# Patient Record
Sex: FEMALE | Race: WHITE | NOT HISPANIC OR LATINO | URBAN - METROPOLITAN AREA
[De-identification: names, ages, dates, MRNs, and addresses within clinical notes are randomized per-mention and may not be internally consistent; named-entity substitution may affect disease eponyms.]

---

## 2017-06-21 ENCOUNTER — INPATIENT (INPATIENT)
Facility: HOSPITAL | Age: 82
LOS: 2 days | Discharge: HOME | DRG: 184 | End: 2017-06-24
Attending: SURGERY | Admitting: SURGERY
Payer: COMMERCIAL

## 2017-06-21 VITALS
OXYGEN SATURATION: 96 % | RESPIRATION RATE: 18 BRPM | DIASTOLIC BLOOD PRESSURE: 73 MMHG | TEMPERATURE: 98 F | SYSTOLIC BLOOD PRESSURE: 172 MMHG | HEART RATE: 82 BPM

## 2017-06-21 DIAGNOSIS — Z98.51 TUBAL LIGATION STATUS: Chronic | ICD-10-CM

## 2017-06-21 LAB
ALBUMIN SERPL ELPH-MCNC: 4.1 G/DL — SIGNIFICANT CHANGE UP (ref 3.3–5)
ALP SERPL-CCNC: 58 U/L — SIGNIFICANT CHANGE UP (ref 40–120)
ALT FLD-CCNC: 27 U/L RC — SIGNIFICANT CHANGE UP (ref 10–45)
ANION GAP SERPL CALC-SCNC: 15 MMOL/L — SIGNIFICANT CHANGE UP (ref 5–17)
APTT BLD: 27.8 SEC — SIGNIFICANT CHANGE UP (ref 27.5–37.4)
AST SERPL-CCNC: 34 U/L — SIGNIFICANT CHANGE UP (ref 10–40)
BILIRUB SERPL-MCNC: 0.4 MG/DL — SIGNIFICANT CHANGE UP (ref 0.2–1.2)
BUN SERPL-MCNC: 22 MG/DL — SIGNIFICANT CHANGE UP (ref 7–23)
CALCIUM SERPL-MCNC: 9.7 MG/DL — SIGNIFICANT CHANGE UP (ref 8.4–10.5)
CHLORIDE SERPL-SCNC: 103 MMOL/L — SIGNIFICANT CHANGE UP (ref 96–108)
CO2 SERPL-SCNC: 21 MMOL/L — LOW (ref 22–31)
CREAT SERPL-MCNC: 0.88 MG/DL — SIGNIFICANT CHANGE UP (ref 0.5–1.3)
GLUCOSE SERPL-MCNC: 115 MG/DL — HIGH (ref 70–99)
HCT VFR BLD CALC: 45.1 % — HIGH (ref 34.5–45)
HGB BLD-MCNC: 15.2 G/DL — SIGNIFICANT CHANGE UP (ref 11.5–15.5)
INR BLD: 0.96 RATIO — SIGNIFICANT CHANGE UP (ref 0.88–1.16)
MAGNESIUM SERPL-MCNC: 2.2 MG/DL — SIGNIFICANT CHANGE UP (ref 1.6–2.6)
MCHC RBC-ENTMCNC: 31.1 PG — SIGNIFICANT CHANGE UP (ref 27–34)
MCHC RBC-ENTMCNC: 33.7 GM/DL — SIGNIFICANT CHANGE UP (ref 32–36)
MCV RBC AUTO: 92.2 FL — SIGNIFICANT CHANGE UP (ref 80–100)
PHOSPHATE SERPL-MCNC: 3.4 MG/DL — SIGNIFICANT CHANGE UP (ref 2.5–4.5)
PLATELET # BLD AUTO: 195 K/UL — SIGNIFICANT CHANGE UP (ref 150–400)
POTASSIUM SERPL-MCNC: 4.9 MMOL/L — SIGNIFICANT CHANGE UP (ref 3.5–5.3)
POTASSIUM SERPL-SCNC: 4.9 MMOL/L — SIGNIFICANT CHANGE UP (ref 3.5–5.3)
PROT SERPL-MCNC: 7.6 G/DL — SIGNIFICANT CHANGE UP (ref 6–8.3)
PROTHROM AB SERPL-ACNC: 10.4 SEC — SIGNIFICANT CHANGE UP (ref 9.8–12.7)
RBC # BLD: 4.89 M/UL — SIGNIFICANT CHANGE UP (ref 3.8–5.2)
RBC # FLD: 12.7 % — SIGNIFICANT CHANGE UP (ref 10.3–14.5)
SODIUM SERPL-SCNC: 139 MMOL/L — SIGNIFICANT CHANGE UP (ref 135–145)
WBC # BLD: 12.6 K/UL — HIGH (ref 3.8–10.5)
WBC # FLD AUTO: 12.6 K/UL — HIGH (ref 3.8–10.5)

## 2017-06-21 PROCEDURE — 71250 CT THORAX DX C-: CPT | Mod: 26

## 2017-06-21 PROCEDURE — 71100 X-RAY EXAM RIBS UNI 2 VIEWS: CPT | Mod: 26

## 2017-06-21 PROCEDURE — 70450 CT HEAD/BRAIN W/O DYE: CPT | Mod: 26

## 2017-06-21 PROCEDURE — 99285 EMERGENCY DEPT VISIT HI MDM: CPT

## 2017-06-21 RX ORDER — LIDOCAINE 4 G/100G
1 CREAM TOPICAL ONCE
Qty: 0 | Refills: 0 | Status: COMPLETED | OUTPATIENT
Start: 2017-06-21 | End: 2017-06-21

## 2017-06-21 RX ORDER — ACETAMINOPHEN 500 MG
1000 TABLET ORAL ONCE
Qty: 0 | Refills: 0 | Status: COMPLETED | OUTPATIENT
Start: 2017-06-21 | End: 2017-06-21

## 2017-06-21 RX ADMIN — Medication 400 MILLIGRAM(S): at 22:28

## 2017-06-21 RX ADMIN — LIDOCAINE 1 PATCH: 4 CREAM TOPICAL at 22:28

## 2017-06-21 NOTE — ED ADULT NURSE NOTE - OBJECTIVE STATEMENT
86 year old female A&OX3 presents with syncope and right rib pain. Patient and son state patient was getting changed in the bathroom this morning when she began to feel weak, loss consciousness, and possibly landed on the right side of her chest. Patient and son state she quickly regain consciousness. Patient states she has felt weak all day. Patient's lung sounds clear and equal bilaterally. Patient denies nausea, vomiting, dizziness, shortness of breath, chest pain, fevers, chills, burning urination, difficulty urinating, diarrhea, constipation.

## 2017-06-21 NOTE — ED PROVIDER NOTE - OBJECTIVE STATEMENT
86 F w htn, hld, remote bladder ca p/w fall this morning in bathroom. States she was feeling well, when she bent over to adjust her bra strap and fell hitting her ribs on the counter, no head trauma, fell into a seated position. Believes she lost consciousness for a few seconds but was never fully laying on the floor, and woke up without memory loss or confusion. Afterwards no dizziness, no headache, no vision changes. Felt palpitations for a few minutes after. No history of cardiac problems or syncope. Went to a mammogram afterwards and then to her hip clinic where she was advised to come to the ER. No self or family history of cva. No  DM2 or smoking.

## 2017-06-21 NOTE — ED PROVIDER NOTE - MEDICAL DECISION MAKING DETAILS
patient w/ brief syncopal event this am concerning for TIA, rib trauma no head trauma. No neuro symptoms and exam normal. Will check ekg, place on monitor, check labs and ua. patient w/ brief syncopal event this am concerning for TIA, rib trauma no head trauma. No neuro symptoms and exam normal. Will check ekg, place on monitor, check labs and ua.    Attending MD Benitez: 86 F w htn, hld, remote bladder ca p/w fall this morning in bathroom. States she was feeling well, when she bent over to adjust her bra strap and fell hitting her ribs on the counter, no head trauma, fell into a seated position. Believes she lost consciousness for a few seconds but was never fully laying on the floor, and woke up without memory loss or confusion. Complains of right sided rib pain. Attending MD Benitez: A & O x 3, NAD, HEENT WNL and no facial asymmetry; lungs CTAB, Right sided rib TTP, no crepitus,  heart with reg rhythm without murmur; abdomen soft NTND; extremities with no edema; skin with no rashes, neuro exam non focal with no motor or sensory deficits.  Plan: rule out ACS, EKG, labs, monitor, head CT and chest xray and rib series to rule out fx.  Admit.

## 2017-06-21 NOTE — ED PROVIDER NOTE - ATTENDING CONTRIBUTION TO CARE
Attending MD Benitez:  I personally have seen and examined this patient.  Resident note reviewed and agree on plan of care and except where noted.  See MDM for details.

## 2017-06-21 NOTE — ED ADULT TRIAGE NOTE - CHIEF COMPLAINT QUOTE
syncope with R rib pain  patient passed out while getting dressed in the bathroom this morning; hit side on corner of sink  Denies head pain or injury

## 2017-06-21 NOTE — ED PROVIDER NOTE - PROGRESS NOTE DETAILS
ATTENDING MD Clark.  Pt signed out to me with trauma consult pending, CT head, troponins, Syncopized while looking down, R 3/4/5/7 rib fxs.  Following signout trauma requesting admission to their service for continued management of multiple rib fxs without apparent flail segment.  Stable for admission to trauma service.  EKG, labs non-actionable.

## 2017-06-22 DIAGNOSIS — M81.0 AGE-RELATED OSTEOPOROSIS WITHOUT CURRENT PATHOLOGICAL FRACTURE: ICD-10-CM

## 2017-06-22 DIAGNOSIS — N39.0 URINARY TRACT INFECTION, SITE NOT SPECIFIED: ICD-10-CM

## 2017-06-22 DIAGNOSIS — I10 ESSENTIAL (PRIMARY) HYPERTENSION: ICD-10-CM

## 2017-06-22 DIAGNOSIS — E78.5 HYPERLIPIDEMIA, UNSPECIFIED: ICD-10-CM

## 2017-06-22 DIAGNOSIS — R55 SYNCOPE AND COLLAPSE: ICD-10-CM

## 2017-06-22 DIAGNOSIS — S22.41XA MULTIPLE FRACTURES OF RIBS, RIGHT SIDE, INITIAL ENCOUNTER FOR CLOSED FRACTURE: ICD-10-CM

## 2017-06-22 LAB
ANION GAP SERPL CALC-SCNC: 12 MMOL/L — SIGNIFICANT CHANGE UP (ref 5–17)
APPEARANCE UR: ABNORMAL
APTT BLD: 34.8 SEC — SIGNIFICANT CHANGE UP (ref 27.5–37.4)
BILIRUB UR-MCNC: NEGATIVE — SIGNIFICANT CHANGE UP
BUN SERPL-MCNC: 17 MG/DL — SIGNIFICANT CHANGE UP (ref 7–23)
CALCIUM SERPL-MCNC: 9 MG/DL — SIGNIFICANT CHANGE UP (ref 8.4–10.5)
CHLORIDE SERPL-SCNC: 105 MMOL/L — SIGNIFICANT CHANGE UP (ref 96–108)
CO2 SERPL-SCNC: 23 MMOL/L — SIGNIFICANT CHANGE UP (ref 22–31)
COLOR SPEC: YELLOW — SIGNIFICANT CHANGE UP
CREAT SERPL-MCNC: 0.79 MG/DL — SIGNIFICANT CHANGE UP (ref 0.5–1.3)
DIFF PNL FLD: ABNORMAL
GLUCOSE SERPL-MCNC: 99 MG/DL — SIGNIFICANT CHANGE UP (ref 70–99)
GLUCOSE UR QL: NEGATIVE — SIGNIFICANT CHANGE UP
HCT VFR BLD CALC: 39.6 % — SIGNIFICANT CHANGE UP (ref 34.5–45)
HGB BLD-MCNC: 13.3 G/DL — SIGNIFICANT CHANGE UP (ref 11.5–15.5)
INR BLD: 1.05 RATIO — SIGNIFICANT CHANGE UP (ref 0.88–1.16)
KETONES UR-MCNC: NEGATIVE — SIGNIFICANT CHANGE UP
LEUKOCYTE ESTERASE UR-ACNC: ABNORMAL
MAGNESIUM SERPL-MCNC: 2.1 MG/DL — SIGNIFICANT CHANGE UP (ref 1.6–2.6)
MCHC RBC-ENTMCNC: 30.8 PG — SIGNIFICANT CHANGE UP (ref 27–34)
MCHC RBC-ENTMCNC: 33.6 GM/DL — SIGNIFICANT CHANGE UP (ref 32–36)
MCV RBC AUTO: 91.6 FL — SIGNIFICANT CHANGE UP (ref 80–100)
NITRITE UR-MCNC: POSITIVE
PH UR: 6 — SIGNIFICANT CHANGE UP (ref 5–8)
PHOSPHATE SERPL-MCNC: 3.3 MG/DL — SIGNIFICANT CHANGE UP (ref 2.5–4.5)
PLATELET # BLD AUTO: 179 K/UL — SIGNIFICANT CHANGE UP (ref 150–400)
POTASSIUM SERPL-MCNC: 4.1 MMOL/L — SIGNIFICANT CHANGE UP (ref 3.5–5.3)
POTASSIUM SERPL-SCNC: 4.1 MMOL/L — SIGNIFICANT CHANGE UP (ref 3.5–5.3)
PROT UR-MCNC: SIGNIFICANT CHANGE UP
PROTHROM AB SERPL-ACNC: 11.4 SEC — SIGNIFICANT CHANGE UP (ref 9.8–12.7)
RBC # BLD: 4.32 M/UL — SIGNIFICANT CHANGE UP (ref 3.8–5.2)
RBC # FLD: 12.4 % — SIGNIFICANT CHANGE UP (ref 10.3–14.5)
SODIUM SERPL-SCNC: 140 MMOL/L — SIGNIFICANT CHANGE UP (ref 135–145)
SP GR SPEC: 1.02 — SIGNIFICANT CHANGE UP (ref 1.01–1.02)
UROBILINOGEN FLD QL: NEGATIVE — SIGNIFICANT CHANGE UP
WBC # BLD: 8.7 K/UL — SIGNIFICANT CHANGE UP (ref 3.8–10.5)
WBC # FLD AUTO: 8.7 K/UL — SIGNIFICANT CHANGE UP (ref 3.8–10.5)

## 2017-06-22 PROCEDURE — 99223 1ST HOSP IP/OBS HIGH 75: CPT

## 2017-06-22 PROCEDURE — 71010: CPT | Mod: 26

## 2017-06-22 PROCEDURE — 72170 X-RAY EXAM OF PELVIS: CPT | Mod: 26

## 2017-06-22 RX ORDER — IBUPROFEN 200 MG
400 TABLET ORAL EVERY 6 HOURS
Qty: 0 | Refills: 0 | Status: COMPLETED | OUTPATIENT
Start: 2017-06-22 | End: 2017-06-23

## 2017-06-22 RX ORDER — OXYCODONE HYDROCHLORIDE 5 MG/1
5 TABLET ORAL EVERY 4 HOURS
Qty: 0 | Refills: 0 | Status: DISCONTINUED | OUTPATIENT
Start: 2017-06-22 | End: 2017-06-24

## 2017-06-22 RX ORDER — ENOXAPARIN SODIUM 100 MG/ML
40 INJECTION SUBCUTANEOUS ONCE
Qty: 0 | Refills: 0 | Status: COMPLETED | OUTPATIENT
Start: 2017-06-22 | End: 2017-06-22

## 2017-06-22 RX ORDER — OXYCODONE HYDROCHLORIDE 5 MG/1
10 TABLET ORAL EVERY 4 HOURS
Qty: 0 | Refills: 0 | Status: DISCONTINUED | OUTPATIENT
Start: 2017-06-22 | End: 2017-06-24

## 2017-06-22 RX ORDER — ACETAMINOPHEN 500 MG
975 TABLET ORAL EVERY 6 HOURS
Qty: 0 | Refills: 0 | Status: DISCONTINUED | OUTPATIENT
Start: 2017-06-22 | End: 2017-06-24

## 2017-06-22 RX ORDER — AMLODIPINE BESYLATE 2.5 MG/1
5 TABLET ORAL DAILY
Qty: 0 | Refills: 0 | Status: DISCONTINUED | OUTPATIENT
Start: 2017-06-22 | End: 2017-06-24

## 2017-06-22 RX ORDER — LIDOCAINE 4 G/100G
1 CREAM TOPICAL DAILY
Qty: 0 | Refills: 0 | Status: DISCONTINUED | OUTPATIENT
Start: 2017-06-22 | End: 2017-06-24

## 2017-06-22 RX ORDER — LISINOPRIL 2.5 MG/1
40 TABLET ORAL DAILY
Qty: 0 | Refills: 0 | Status: DISCONTINUED | OUTPATIENT
Start: 2017-06-22 | End: 2017-06-24

## 2017-06-22 RX ORDER — ENOXAPARIN SODIUM 100 MG/ML
40 INJECTION SUBCUTANEOUS DAILY
Qty: 0 | Refills: 0 | Status: DISCONTINUED | OUTPATIENT
Start: 2017-06-22 | End: 2017-06-24

## 2017-06-22 RX ADMIN — Medication 400 MILLIGRAM(S): at 03:24

## 2017-06-22 RX ADMIN — Medication 975 MILLIGRAM(S): at 17:09

## 2017-06-22 RX ADMIN — Medication 400 MILLIGRAM(S): at 21:27

## 2017-06-22 RX ADMIN — ENOXAPARIN SODIUM 40 MILLIGRAM(S): 100 INJECTION SUBCUTANEOUS at 11:40

## 2017-06-22 RX ADMIN — Medication 975 MILLIGRAM(S): at 11:40

## 2017-06-22 RX ADMIN — Medication 400 MILLIGRAM(S): at 17:57

## 2017-06-22 RX ADMIN — LIDOCAINE 1 PATCH: 4 CREAM TOPICAL at 12:08

## 2017-06-22 RX ADMIN — Medication 1 TABLET(S): at 18:08

## 2017-06-22 RX ADMIN — ENOXAPARIN SODIUM 40 MILLIGRAM(S): 100 INJECTION SUBCUTANEOUS at 02:45

## 2017-06-22 RX ADMIN — Medication 400 MILLIGRAM(S): at 17:08

## 2017-06-22 RX ADMIN — Medication 975 MILLIGRAM(S): at 05:41

## 2017-06-22 RX ADMIN — Medication 400 MILLIGRAM(S): at 11:41

## 2017-06-22 RX ADMIN — Medication 400 MILLIGRAM(S): at 12:41

## 2017-06-22 RX ADMIN — Medication 400 MILLIGRAM(S): at 22:00

## 2017-06-22 RX ADMIN — Medication 975 MILLIGRAM(S): at 23:22

## 2017-06-22 RX ADMIN — OXYCODONE HYDROCHLORIDE 5 MILLIGRAM(S): 5 TABLET ORAL at 01:33

## 2017-06-22 RX ADMIN — AMLODIPINE BESYLATE 5 MILLIGRAM(S): 2.5 TABLET ORAL at 02:43

## 2017-06-22 RX ADMIN — Medication 400 MILLIGRAM(S): at 02:43

## 2017-06-22 RX ADMIN — LISINOPRIL 40 MILLIGRAM(S): 2.5 TABLET ORAL at 05:57

## 2017-06-22 NOTE — H&P ADULT - ASSESSMENT
86F w/ R 3-8 rib fractures   - Admit to trauma surgery   - Regular diet  - Incentive spirometry   - PT  - Multimodal pain control  - D/w Dr Nelson    #[id number] 86F w/ R 3-8 rib fractures   - Admit to trauma surgery; SICU consult given multiple rib fractures in a geriatric patient   - Regular diet  - Incentive spirometry   - PT  - Multimodal pain control  - D/w Dr Nelson    #[id number]

## 2017-06-22 NOTE — H&P ADULT - ATTENDING COMMENTS
86 year old female presented after a fall in her bathroom with resulting Left 3-8 rib fractures. She is hemodynamically appropriate and able to take in 1000cc on the incentive spirometer. GCS 15. She is not in respiratory distress. I have reviewed her past medical/ surgical history, medications, imaging and labs.  She is admitted with multiple left sided rib fractures- multimodal pain management with tylenol, ibuprofen and lidocaine patch. The patient is to ambulate with physical therapy today.     Essential hypertension- continue home medications of norvasc and ramipril  Patient can be on regular diet  I have discussed plan with her and her sons who are present at bedside.

## 2017-06-22 NOTE — CONSULT NOTE ADULT - PROBLEM SELECTOR RECOMMENDATION 5
1. Resume alendronate on discharge.  2. Patient was to have DEXA screening performed yesterday; should reschedule on discharge.  3. Can check TSH for screening; appears euthyroid on exam.

## 2017-06-22 NOTE — CONSULT NOTE ADULT - SUBJECTIVE AND OBJECTIVE BOX
Chief Complaint: Fall    HPI: Ms. Warner is an 86 year old female with a PMHx significant for HTN, HLD, osteoporosis who presented to the ER after a fall. Per the patient, she was standing alone in her bathroom when she looked down towards the sink, and then she blacked out and fell to the ground. She fell into a seated position, noted some pain in her ribs on the right side, and was ultimately able to rise to a standing position. She denies any palpitations, chest pain, shortness of breath, diaphoresis prior to her fall. She denies frequent or recent falls. Three years ago she had a stumble while walking laundry down the stairs and landed on her left side; she may have broken ribs during that fall. She reports a recent history of dizziness (room spinning) when moving her head quickly. She also reports numbness/tingling in her legs from time to time. There is no report of recent fever or urinary symptoms. She has never had a stress test. Per patient and her son, she walks 1-2 miles per day, up to 10 miles per week, without chest pain or SOB.      PAST MEDICAL & SURGICAL HISTORY:  Osteoporosis  Cystocele with pessary  HTN (hypertension)  High cholesterol  H/O tubal ligation      Review of Systems:   CONSTITUTIONAL: No fever.  EYES: No eye pain or discharge.  ENMT:  No sinus or throat pain  NECK: No pain or stiffness  RESPIRATORY: No cough, wheezing, chills or hemoptysis; No shortness of breath  CARDIOVASCULAR: No chest pain, palpitations, dizziness, or leg swelling  GASTROINTESTINAL: No abdominal or epigastric pain. No nausea, vomiting, or hematemesis; No diarrhea or constipation. No melena or hematochezia.  GENITOURINARY: No dysuria or incontinence. Has pessary  NEUROLOGICAL: No headaches, memory loss, loss of strength, numbness, or tremors. See HPI.  SKIN: No rashes.  LYMPH NODES: No enlarged glands  ENDOCRINE: No heat or cold intolerance; No hair loss  MUSCULOSKELETAL: No joint pain or swelling; No muscle, back, or extremity pain  PSYCHIATRIC: No depression, anxiety, mood swings, or difficulty sleeping  HEME/LYMPH: No easy bruising, or bleeding gums  ALLERY AND IMMUNOLOGIC: No hives or eczema    Allergies: No Known Allergies    Intolerances: Denies    Social History: Lives with two adopted sons. Former clerical worker. No smoking, EtOH, or substance abuse history.    FAMILY HISTORY:  Sister recently  after diagnosis of cancer at age 86.      MEDICATIONS  (STANDING):  acetaminophen   Tablet 975milliGRAM(s) Oral every 6 hours  ibuprofen  Tablet 400milliGRAM(s) Oral every 6 hours  lidocaine   Patch 1Patch Transdermal daily  amLODIPine   Tablet 5milliGRAM(s) Oral daily  lisinopril 40milliGRAM(s) Oral daily  enoxaparin Injectable 40milliGRAM(s) SubCutaneous daily    MEDICATIONS  (PRN):  oxyCODONE IR 5milliGRAM(s) Oral every 4 hours PRN Moderate Pain (4 - 6)  oxyCODONE IR 10milliGRAM(s) Oral every 4 hours PRN Severe Pain (7 - 10)      Vital Signs Last 24 Hrs  T(C): 36.8, Max: 36.9 (06-21 @ 20:17)  HR: 63 (58 - 84)  BP: 141/63 (118/56 - 172/73)  RR: 23 (17 - 33)  SpO2: 95% (95% - 97%)  Wt(kg): --  CAPILLARY BLOOD GLUCOSE    I&O's Summary    I & Os for current day (as of 2017 17:12)  =============================================  IN: 300 ml / OUT: 700 ml / NET: -400 ml      PHYSICAL EXAM:  GENERAL: NAD, well-developed  HEAD:  Atraumatic, Normocephalic  EYES: EOMI, PERRLA, conjunctiva and sclera clear. + horizontal nystagmus.  NECK: Supple, No JVD. + carotid bruit on left  CHEST/LUNG: Clear to auscultation bilaterally; No wheeze. Mild tenderness to palpation over right rib cage.  HEART: Regular rate and rhythm; No murmurs, rubs, or gallops  ABDOMEN: Soft, Nontender, Nondistended; Bowel sounds present  EXTREMITIES:  2+ Peripheral Pulses, No clubbing, cyanosis, or edema  PSYCH: AAOx3. Mood appropriate.  NEUROLOGY: CN 2-12 intact. No facial droop. Tongue midline. Deltoids 5/5 bilaterally. Biceps 5/5 bilaterally. Triceps 5/5 bilaterally. Hip flexors 4/5 bilaterally. Hip abductors 5/5 bilaterally. Hip adductors 5/5 bilaterally. Foot plantar/dorsiflexors 5/5 bilaterally. Downgoing Babinski bilaterally. Sensation to light touch intact in feet and legs bilaterally. Proprioception intact in feet. Nystagmus as above.  SKIN: No rashes or lesions    LABS:                        13.3   8.7   )-----------( 179      ( 2017 05:52 )             39.6     06-    140  |  105  |  17  ----------------------------<  99  4.1   |  23  |  0.79    Ca    9.0      2017 05:52  Phos  3.3       Mg     2.1         TPro  7.6  /  Alb  4.1  /  TBili  0.4  /  DBili  x   /  AST  34  /  ALT  27  /  AlkPhos  58  06-21    PT/INR - ( 2017 05:52 )   PT: 11.4 sec;   INR: 1.05 ratio         PTT - ( 2017 05:52 )  PTT:34.8 sec  CARDIAC MARKERS ( 2017 20:48 )  x     / <0.01 ng/mL / 150 U/L / x     / 3.1 ng/mL      Urinalysis Basic - ( 2017 08:08 )    Color: Yellow / Appearance: SL Turbid / S.019 / pH: x  Gluc: x / Ketone: Negative  / Bili: Negative / Urobili: Negative   Blood: x / Protein: Trace / Nitrite: Positive   Leuk Esterase: Large / RBC: 2-5 /HPF / WBC >50 /HPF   Sq Epi: x / Non Sq Epi: x / Bacteria: Many /HPF        RADIOLOGY & ADDITIONAL TESTS:    Imaging Personally Reviewed:    CT Head:No acute intracranial hemorrhage, mass effect, or CT evidence of an acute   transcortical infarct.    Mild to moderate chronic ischemic changes in the frontoparietal white   matter and indeterminate age punctate lacunar infarcts in the bilateral   basal ganglia.    CT Chest:Acute fractures of the right fourth through eighth ribs fractured at 2   sites and acute fracture of the right anterior third rib; several   fractures are mildly displaced. Trace right pleural effusion. Several chronic appearing left-sided   rib fractures. There is a chronic appearing deformity of the right   scapula.    CXR; Right-sided rib fractures. Clear lungs.    Pelvis XR: The bones are diffusely demineralized. There is no acute   fracture or dislocation. There are degenerative changes of the lumbar   spine, hip joints, and pubic symphysis. Bilateral radiopacities within   the pelvis are likely secondary to calcified fibroids. Vascular   opacifications are noted.    EKG Personally Reviewed: NSR at 87 bpm. No ST-T changes.    Consultant(s) Notes Reviewed:  Surgery/SICU    Care Discussed with Consultants/Other Providers: Surgery - Dr. Nicholson, SICU team

## 2017-06-22 NOTE — CONSULT NOTE ADULT - ASSESSMENT
86y female with history of HLD, HTN, s/p mechanical fall with R 3-8 rib fractures. Brought to SICU for respiratory monitoring    Neuro: Will need multimodal pain control  - Lidocaine patch, motrin, oxycodone PRN  CV: Restart home medications  Pulm: Incentive spirometry, must ensure good pulmonary toilet  GI/Nutrition: Continue regular diet  /Renal: Monitor electrolytes on BMP  ID: No sign of infection  Tubes/Lines/Drains: Continue peripheral iVS  Endocrine: Monitor glycemic control on BMP  Skin: Encourage offloading  Prophylaxis: DVT chemoprophylaxis with lovenox  Dispo: Full code, remain in SICU

## 2017-06-22 NOTE — CONSULT NOTE ADULT - PROBLEM SELECTOR RECOMMENDATION 2
Patient has minimal pain, oxygenating well, not tachycardic. Likely has small hemothorax as well based on CT imaging.  1. Incentive spirometry.  2. Multimodal pain control as ordered.  3. PT evaluation.  4. Add vitamin D supplement for fall prevention.

## 2017-06-22 NOTE — CONSULT NOTE ADULT - PROBLEM SELECTOR RECOMMENDATION 6
Given unclear etiology of syncope, not unreasonable to treat with short course of antimicrobials. Pessary can increase incidence of UTIs; it can also cause colonization, which may be what we're seeing here. WBC count is normal. Patient is afebrile.  1. Given overall clinical picture, would treat with small course of antibiotics. Recommend ceftriaxone 1 g IV q24h x 3 days and to f/u cultures.  2. If cultures with polymicrobial growth, this would support colonization further, in which case stopping antibiotics and observing is reasonable.  3. Monitor for diarrhea.

## 2017-06-22 NOTE — CONSULT NOTE ADULT - SUBJECTIVE AND OBJECTIVE BOX
HISTORY OF PRESENT ILLNESS:  This is an 86 year old female w/ HTN s/p fall in the bathroom this morning. She was getting ready in the bathroom to go for her mammogram appointment, and while she was getting dressed, she blacked out fell in a seated position. Unknown LOC, no memory loss, no seizures. She was flustered by falling and it took her a moment or two to calm down and get up. Denies palpitations or chest pain. No history of cardiac disease. No history of syncopal episodes. Found on imaging to have R 3-8 rib fractures, SICU consult called.    PAST MEDICAL HISTORY: Osteoporosis  Cystocele  HTN (hypertension)  High cholesterol      PAST SURGICAL HISTORY: H/O tubal ligation      FAMILY HISTORY: No pertinent family history in first degree relatives      SOCIAL HISTORY: No smoking  No alcohol abuse  No recreational drug use    CODE STATUS: Full Code    HOME MEDICATIONS:   · 	alendronate 70 mg oral tablet: 1 tab(s) orally once a week, Last Dose Taken:    · 	Norvasc 5 mg oral tablet: 1 tab(s) orally once a day, Last Dose Taken:    · 	simvastatin 10 mg oral tablet: 1 tab(s) orally once a day (at bedtime), Last Dose Taken:    · 	ramipril 5 mg oral capsule: 1 cap(s) orally once a day, Last Dose Taken:      ALLERGIES: No Known Allergies      VITAL SIGNS:  ICU Vital Signs Last 24 Hrs  T(C): 36.5, Max: 36.9 (06-21 @ 20:17)  T(F): 97.7, Max: 98.5 (06-21 @ 20:17)  HR: 81 (76 - 84)  BP: 156/74 (124/72 - 172/73)  BP(mean): 107 (107 - 107)  ABP: --  ABP(mean): --  RR: 33 (18 - 33)  SpO2: 95% (95% - 96%)      NEURO  Exam: Awake, alert and oriented x4. Pain controlled  acetaminophen   Tablet 975milliGRAM(s) Oral every 6 hours  oxyCODONE IR 5milliGRAM(s) Oral every 4 hours PRN Moderate Pain (4 - 6)  oxyCODONE IR 10milliGRAM(s) Oral every 4 hours PRN Severe Pain (7 - 10)  ibuprofen  Tablet 400milliGRAM(s) Oral every 6 hours      RESPIRATORY    Exam: Clear to auscultation bilaterally. No increased work of breathing      CARDIOVASCULAR    Exam: Regular rate and rhythm  Cardiac Rhythm: Sinus  amLODIPine   Tablet 5milliGRAM(s) Oral daily  lisinopril 40milliGRAM(s) Oral daily      GI/NUTRITION  Exam: Abdomen soft, nontender, minimal ecchymosis along left thorax  Diet: Reg      GENITOURINARY/RENAL      Weight (kg): 46.9 (06-22 @ 03:12)  06-21    139  |  103  |  22  ----------------------------<  115<H>  4.9   |  21<L>  |  0.88    Ca    9.7      21 Jun 2017 20:48  Phos  3.4     06-21  Mg     2.2     06-21    TPro  7.6  /  Alb  4.1  /  TBili  0.4  /  DBili  x   /  AST  34  /  ALT  27  /  AlkPhos  58  06-21      HEMATOLOGIC  [ ] VTE Prophylaxis:  enoxaparin Injectable 40milliGRAM(s) SubCutaneous daily                          15.2   12.6  )-----------( 195      ( 21 Jun 2017 20:48 )             45.1     PT/INR - ( 21 Jun 2017 20:48 )   PT: 10.4 sec;   INR: 0.96 ratio         PTT - ( 21 Jun 2017 20:48 )  PTT:27.8 sec  Transfusion: [ ] PRBC	[ ] Platelets	[ ] FFP	[ ] Cryoprecipitate      INFECTIOUS DISEASES    RECENT CULTURES:x      ENDOCRINE    CAPILLARY BLOOD GLUCOSE      PATIENT CARE ACCESS DEVICES:  [x ] Peripheral IV  [ ] Central Venous Line	[ ] R	[ ] L	[ ] IJ	[ ] Fem	[ ] SC	Placed:   [ ] Arterial Line		[ ] R	[ ] L	[ ] Fem	[ ] Rad	[ ] Ax	Placed:   [ ] PICC:					[ ] Mediport  [ ] Urinary Catheter, Date Placed:   [x] Necessity of urinary, arterial, and venous catheters discussed    OTHER MEDICATIONS: lidocaine   Patch 1Patch Transdermal daily      IMAGING STUDIES: Acute fractures of the right fourth through eighth ribs fractured at 2 sites  and acute fracture of the right anterior third rib; several fractures are  mildly displaced. Trace right pleural effusion.

## 2017-06-22 NOTE — CONSULT NOTE ADULT - PROBLEM SELECTOR RECOMMENDATION 3
BP has been acceptable.  1. OK to continue norvasc and lisinopril as ordered with hold parameters.  2. F/U orthostatics and titrate PRN.

## 2017-06-22 NOTE — H&P ADULT - NSHPLABSRESULTS_GEN_ALL_CORE
Labs:                        15.2   12.6  )-----------( 195                  45.1     139  |  103  |  22  ----------------------------<  115  4.9   |  21  |  0.88    Imaging    CT head:   No acute intracranial hemorrhage, mass effect, or CT evidence of an acute transcortical infarct.    CT Chest:   Acute fractures of the right fourth through eighth ribs fractured at 2 sites and acute fracture of the right anterior third rib

## 2017-06-22 NOTE — H&P ADULT - NSHPPHYSICALEXAM_GEN_ALL_CORE
Vital Signs Last 24 Hrs  T(C): 36.9, Max: 36.9 (06-21 @ 20:17)  HR: 80 (80 - 82)  BP: 164/73 (164/73 - 172/73)  RR: 18 (18 - 18)  SpO2: 96% (96% - 96%)    Constitutional: Well-developed well nourished female in no acute distress  HEENT: Head is normocephalic and atraumatic, maxillofacial structures stable, no blood or discharge from nares or oral cavity, no sarmiento sign / racoon eyes, EOMI b/l, pupils [ ]mm round and reactive to light b/l, no active drainage or redness  Neck: cervical collar in place, trachea midline - no C-spine TTP  Respiratory: Breath sounds CTA b/l respirations are unlabored, no accessory muscle use, no conversational dyspnea  Cardiovascular: Regular rate & rhythm, +S1, S2  Chest: + Right chest wall TTP, no subQ emphysema or crepitus palpated, no T-spine TTP  Gastrointestinal: Abdomen soft, non-tender, non-distended, no rebound tenderness / guarding, no ecchymosis or external signs of abdominal trauma  Extremities: moving all extremities spontaneously, no point tenderness or deformity noted to upper or lower extremities b/l  Pelvis: stable  Vascular: 2+ radial, femoral, and DP pulses b/l  Neurological: GCS: 15 (4/5/6). A&O x 3; no gross sensory / motor / coordination deficits  Musculoskeletal: 5/5 strength of upper and lower extremities b/l  Back: no C/T/LS spine tenderness to palpation, no step-offs or signs of external trauma to the back    On incentive spirometry, patient was able to pull 1L breaths

## 2017-06-22 NOTE — CONSULT NOTE ADULT - ASSESSMENT
86 year old female with a PMHx significant for HTN, HLD, osteoporosis who presented to the ER after a fall. Per the patient, she was standing alone in her bathroom when she looked down towards the sink, and then she blacked out and fell to the ground. Syncope after changes in head position are always suspicious for orthostatic changes/transient changes in cerebral perfusion; however, exam notable for significant left sided carotid bruit and CT head with age indeterminate lacunar infarcts. Further neurologic work-up is prudent. Symptoms of transient vertigo can also be caused by a central process, but exam more consistent with BPPV, which may be exacerbated by transient hypotension in setting of polypharmacy.

## 2017-06-22 NOTE — H&P ADULT - HISTORY OF PRESENT ILLNESS
This is an 86 year old female w/ HTN s/p fall in the bathroom this morning. She was getting ready in the bathroom to go for her mammogram appointment, and while she was getting dressed, she blacked out fell in a seated position. Unknown LOC, no memory loss, no seizures. She was flustered by falling and it took her a moment or two to calm down and get up. Denies palpitations or chest pain. No history of cardiac disease. No history of syncopal episodes.     1ry survey: intact, GCS 15, AVSS   2ry survey: + R chest wall TTP

## 2017-06-23 DIAGNOSIS — N30.90 CYSTITIS, UNSPECIFIED WITHOUT HEMATURIA: ICD-10-CM

## 2017-06-23 LAB
ANION GAP SERPL CALC-SCNC: 13 MMOL/L — SIGNIFICANT CHANGE UP (ref 5–17)
BUN SERPL-MCNC: 21 MG/DL — SIGNIFICANT CHANGE UP (ref 7–23)
CA-I BLD-SCNC: 1.19 MMOL/L — SIGNIFICANT CHANGE UP (ref 1.12–1.3)
CALCIUM SERPL-MCNC: 9.2 MG/DL — SIGNIFICANT CHANGE UP (ref 8.4–10.5)
CHLORIDE SERPL-SCNC: 106 MMOL/L — SIGNIFICANT CHANGE UP (ref 96–108)
CO2 SERPL-SCNC: 20 MMOL/L — LOW (ref 22–31)
CREAT SERPL-MCNC: 1.04 MG/DL — SIGNIFICANT CHANGE UP (ref 0.5–1.3)
GLUCOSE SERPL-MCNC: 103 MG/DL — HIGH (ref 70–99)
HCT VFR BLD CALC: 41.6 % — SIGNIFICANT CHANGE UP (ref 34.5–45)
HGB BLD-MCNC: 13.5 G/DL — SIGNIFICANT CHANGE UP (ref 11.5–15.5)
MAGNESIUM SERPL-MCNC: 2.1 MG/DL — SIGNIFICANT CHANGE UP (ref 1.6–2.6)
MCHC RBC-ENTMCNC: 29.8 PG — SIGNIFICANT CHANGE UP (ref 27–34)
MCHC RBC-ENTMCNC: 32.3 GM/DL — SIGNIFICANT CHANGE UP (ref 32–36)
MCV RBC AUTO: 92.4 FL — SIGNIFICANT CHANGE UP (ref 80–100)
PHOSPHATE SERPL-MCNC: 3.8 MG/DL — SIGNIFICANT CHANGE UP (ref 2.5–4.5)
PLATELET # BLD AUTO: 178 K/UL — SIGNIFICANT CHANGE UP (ref 150–400)
POTASSIUM SERPL-MCNC: 4.3 MMOL/L — SIGNIFICANT CHANGE UP (ref 3.5–5.3)
POTASSIUM SERPL-SCNC: 4.3 MMOL/L — SIGNIFICANT CHANGE UP (ref 3.5–5.3)
RBC # BLD: 4.51 M/UL — SIGNIFICANT CHANGE UP (ref 3.8–5.2)
RBC # FLD: 12.4 % — SIGNIFICANT CHANGE UP (ref 10.3–14.5)
SODIUM SERPL-SCNC: 139 MMOL/L — SIGNIFICANT CHANGE UP (ref 135–145)
WBC # BLD: 8.6 K/UL — SIGNIFICANT CHANGE UP (ref 3.8–10.5)
WBC # FLD AUTO: 8.6 K/UL — SIGNIFICANT CHANGE UP (ref 3.8–10.5)

## 2017-06-23 PROCEDURE — 99232 SBSQ HOSP IP/OBS MODERATE 35: CPT

## 2017-06-23 PROCEDURE — 71010: CPT | Mod: 26

## 2017-06-23 PROCEDURE — 93880 EXTRACRANIAL BILAT STUDY: CPT | Mod: 26

## 2017-06-23 PROCEDURE — 99233 SBSQ HOSP IP/OBS HIGH 50: CPT

## 2017-06-23 RX ADMIN — Medication 400 MILLIGRAM(S): at 21:20

## 2017-06-23 RX ADMIN — LIDOCAINE 1 PATCH: 4 CREAM TOPICAL at 11:23

## 2017-06-23 RX ADMIN — Medication 400 MILLIGRAM(S): at 17:26

## 2017-06-23 RX ADMIN — Medication 975 MILLIGRAM(S): at 17:26

## 2017-06-23 RX ADMIN — LIDOCAINE 1 PATCH: 4 CREAM TOPICAL at 23:02

## 2017-06-23 RX ADMIN — LISINOPRIL 40 MILLIGRAM(S): 2.5 TABLET ORAL at 05:18

## 2017-06-23 RX ADMIN — Medication 400 MILLIGRAM(S): at 10:06

## 2017-06-23 RX ADMIN — Medication 400 MILLIGRAM(S): at 04:18

## 2017-06-23 RX ADMIN — Medication 400 MILLIGRAM(S): at 06:04

## 2017-06-23 RX ADMIN — Medication 975 MILLIGRAM(S): at 23:03

## 2017-06-23 RX ADMIN — Medication 975 MILLIGRAM(S): at 11:24

## 2017-06-23 RX ADMIN — Medication 1 TABLET(S): at 05:18

## 2017-06-23 RX ADMIN — Medication 400 MILLIGRAM(S): at 18:12

## 2017-06-23 RX ADMIN — Medication 400 MILLIGRAM(S): at 11:00

## 2017-06-23 RX ADMIN — AMLODIPINE BESYLATE 5 MILLIGRAM(S): 2.5 TABLET ORAL at 05:18

## 2017-06-23 RX ADMIN — Medication 400 MILLIGRAM(S): at 22:20

## 2017-06-23 RX ADMIN — ENOXAPARIN SODIUM 40 MILLIGRAM(S): 100 INJECTION SUBCUTANEOUS at 11:24

## 2017-06-23 RX ADMIN — Medication 1 TABLET(S): at 17:26

## 2017-06-23 RX ADMIN — Medication 975 MILLIGRAM(S): at 05:19

## 2017-06-23 NOTE — PROGRESS NOTE ADULT - SUBJECTIVE AND OBJECTIVE BOX
ACS DAILY PROGRESS NOTE    HOSPITAL DAY: 2    INTERVAL EVENTS: Pain is well controlled and she pulls more than 1000mL on IS. UA positive so urine culture sent and patient was started on a 3 day course of bactrim. Carotid duplex performed to evaluate bruit identified on physical exam. 50-69% stenosis of R proximal ICA.    SUBJECTIVE: Patient OOB to chair, son at bedside. Patient without complaints this morning; pain well controlled.     OBJECTIVE:   Gen: NAD, AOx3  Pulm: Normal respiratory pattern, pulling 1000mL on IS  Abd: Soft, NT, ND      Vital Signs Last 24 Hrs  T(C): 36.9, Max: 37.1 (06-23 @ 03:00)  T(F): 98.4, Max: 98.8 (06-23 @ 03:00)  HR: 78 (61 - 89)  BP: 124/62 (118/56 - 166/72)  BP(mean): 87 (81 - 104)  RR: 23 (19 - 31)  SpO2: 96% (94% - 97%)    I&O's Detail  I & Os for 24h ending 23 Jun 2017 07:00  =============================================  IN:    Oral Fluid: 600 ml    Total IN: 600 ml  ---------------------------------------------  OUT:    Voided: 1400 ml    Total OUT: 1400 ml  ---------------------------------------------  Total NET: -800 ml    I & Os for current day (as of 23 Jun 2017 13:16)  =============================================  IN:    Oral Fluid: 300 ml    Total IN: 300 ml  ---------------------------------------------  OUT:    Voided: 500 ml    Total OUT: 500 ml  ---------------------------------------------  Total NET: -200 ml                            13.5   8.6   )-----------( 178      ( 23 Jun 2017 02:33 )             41.6       06-23    139  |  106  |  21  ----------------------------<  103<H>  4.3   |  20<L>  |  1.04    Ca    9.2      23 Jun 2017 02:33  Phos  3.8     06-23  Mg     2.1     06-23    TPro  7.6  /  Alb  4.1  /  TBili  0.4  /  DBili  x   /  AST  34  /  ALT  27  /  AlkPhos  58  06-21

## 2017-06-23 NOTE — PROGRESS NOTE ADULT - SUBJECTIVE AND OBJECTIVE BOX
HISTORY  86y Female w/ a PMHx of HTN, HLD, osteoporosis, and cystocele requiring a pessary presented initially for a fall     24 HOUR EVENTS: Patient's pain is well controlled and she continues to pull more than 1 L on incentive spirometry. MRI/MRA head & neck ordered to further evaluate punctate infarcts seen in her basal ganglia bilaterally but patient has a pessary that is not MRI-compatible so removal would be necessary in order to proceed. UA positive so urine culture sent and patient was started on a 3 day course of Bactrim.    SUBJECTIVE/ROS:  [x] A ten-point review of systems was otherwise negative except as noted.  [ ] Due to altered mental status/intubation, subjective information were not able to be obtained from the patient. History was obtained, to the extent possible, from review of the chart and collateral sources of information.    NEURO  CAM ICU: negative  Exam: awake, alert, oriented x4  Meds:  ·	lidocaine   Patch 1Patch Transdermal daily  ·	acetaminophen   Tablet 975milliGRAM(s) Oral every 6 hours  ·	ibuprofen  Tablet 400milliGRAM(s) Oral every 6 hours  ·	oxyCODONE IR 5milliGRAM(s) Oral every 4 hours PRN Moderate Pain (4 - 6)  ·	oxyCODONE IR 10milliGRAM(s) Oral every 4 hours PRN Severe Pain (7 - 10)  [x] Adequacy of sedation and pain control has been assessed and adjusted    RESPIRATORY  RR: 20 (17 - 30)  SpO2: 96% (94% - 97%)  Exam: unlabored, clear to auscultation bilaterally, right chest wall mildly tender to palpation  Mechanical Ventilation: no  [N/A] Extubation Readiness Assessed  Meds: none    CARDIOVASCULAR  HR: 61 (58 - 89)  BP: 118/57 (118/56 - 166/72)  BP(mean): 82 (81 - 104)  Exam: regular rate and rhythm  Cardiac Rhythm: sinus  Perfusion     [x]Adequate   [ ]Inadequate  Mentation    [x]Normal       [ ]Reduced  Extremities  [x]Warm         [ ]Cool  Volume Status [ ]Hypervolemic [x]Euvolemic [ ]Hypovolemic  Meds:  ·	amLODIPine   Tablet 5milliGRAM(s) Oral daily  ·	lisinopril 40milliGRAM(s) Oral daily    GI/NUTRITION  Exam: soft, nontender, nondistended  Diet: regular  Meds: none    GENITOURINARY        139  |  106  |  21  ----------------------------<  103<H>  4.3   |  20<L>  |  1.04    Ca    9.2      23 Jun 2017 02:33  Phos  3.8  Mg     2.1  [ ] Whitlock catheter, indication: N/A  Meds: none    HEMATOLOGIC  Meds: enoxaparin Injectable 40milliGRAM(s) SubCutaneous daily  [x] VTE Prophylaxis                        13.5   8.6   )-----------( 178      ( 23 Jun 2017 02:33 )             41.6     INFECTIOUS DISEASES  WBC Count: 8.6 K/uL (06-23 @ 02:33)  WBC Count: 8.7 K/uL (06-22 @ 05:52)  RECENT CULTURES: urine (6/22) - pending  Meds: trimethoprim  160 mG/sulfamethoxazole 800 mG 1Tablet(s) Oral two times a day    ENDOCRINE  CAPILLARY BLOOD GLUCOSE: none  Meds: none    ACCESS DEVICES:  [x] Peripheral IV  [ ] Central Venous Line	[ ] R	[ ] L	[ ] IJ	[ ] Fem	[ ] SC	Placed:   [ ] Arterial Line		[ ] R	[ ] L	[ ] Fem	[ ] Rad	[ ] Ax	Placed:   [ ] PICC:					[ ] Mediport  [ ] Urinary Catheter, Date Placed:   [x] Necessity of urinary, arterial, and venous catheters discussed    OTHER MEDICATIONS: none    CODE STATUS: full code    IMAGING: HISTORY  87 y/o female w/ a PMHx of HTN, HLD, osteoporosis, and cystocele requiring a pessary presented s/p fall in her bathroom from standing w/ possible loss of consciousness. 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 UA was also positive for large amounts of leukocyte esterase and many bacteria.     24 HOUR EVENTS: Patient's pain is well controlled and she continues to pull more than 1 L on incentive spirometry. MRI/MRA head & neck ordered to further evaluate punctate infarcts seen in her basal ganglia bilaterally but patient has a pessary that is not MRI-compatible so removal would be necessary in order to proceed. UA positive so urine culture sent and patient was started on a 3 day course of Bactrim.    SUBJECTIVE/ROS:  [x] A ten-point review of systems was otherwise negative except as noted.  [ ] Due to altered mental status/intubation, subjective information were not able to be obtained from the patient. History was obtained, to the extent possible, from review of the chart and collateral sources of information.    NEURO  CAM ICU: negative  Exam: awake, alert, oriented x4  Meds:  ·	lidocaine   Patch 1Patch Transdermal daily  ·	acetaminophen   Tablet 975milliGRAM(s) Oral every 6 hours  ·	ibuprofen  Tablet 400milliGRAM(s) Oral every 6 hours  ·	oxyCODONE IR 5milliGRAM(s) Oral every 4 hours PRN Moderate Pain (4 - 6)  ·	oxyCODONE IR 10milliGRAM(s) Oral every 4 hours PRN Severe Pain (7 - 10)  [x] Adequacy of sedation and pain control has been assessed and adjusted    RESPIRATORY  RR: 20 (17 - 30)  SpO2: 96% (94% - 97%)  Exam: unlabored, clear to auscultation bilaterally, right chest wall mildly tender to palpation  Mechanical Ventilation: no  [N/A] Extubation Readiness Assessed  Meds: none    CARDIOVASCULAR  HR: 61 (58 - 89)  BP: 118/57 (118/56 - 166/72)  BP(mean): 82 (81 - 104)  Exam: regular rate and rhythm  Cardiac Rhythm: sinus  Perfusion     [x]Adequate   [ ]Inadequate  Mentation    [x]Normal       [ ]Reduced  Extremities  [x]Warm         [ ]Cool  Volume Status [ ]Hypervolemic [x]Euvolemic [ ]Hypovolemic  Meds:  ·	amLODIPine   Tablet 5milliGRAM(s) Oral daily  ·	lisinopril 40milliGRAM(s) Oral daily    GI/NUTRITION  Exam: soft, nontender, nondistended  Diet: regular  Meds: none    GENITOURINARY        139  |  106  |  21  ----------------------------<  103<H>  4.3   |  20<L>  |  1.04    Ca    9.2      23 Jun 2017 02:33  Phos  3.8  Mg     2.1  [ ] Whitlock catheter, indication: N/A  Meds: none    HEMATOLOGIC  Meds: enoxaparin Injectable 40milliGRAM(s) SubCutaneous daily  [x] VTE Prophylaxis                        13.5   8.6   )-----------( 178      ( 23 Jun 2017 02:33 )             41.6     INFECTIOUS DISEASES  WBC Count: 8.6 K/uL (06-23 @ 02:33)  WBC Count: 8.7 K/uL (06-22 @ 05:52)  RECENT CULTURES: urine (6/22) - pending  Meds: trimethoprim  160 mG/sulfamethoxazole 800 mG 1Tablet(s) Oral two times a day    ENDOCRINE  CAPILLARY BLOOD GLUCOSE: none  Meds: none    ACCESS DEVICES:  [x] Peripheral IV  [ ] Central Venous Line	[ ] R	[ ] L	[ ] IJ	[ ] Fem	[ ] SC	Placed:   [ ] Arterial Line		[ ] R	[ ] L	[ ] Fem	[ ] Rad	[ ] Ax	Placed:   [ ] PICC:					[ ] Mediport  [ ] Urinary Catheter, Date Placed:   [x] Necessity of urinary, arterial, and venous catheters discussed    OTHER MEDICATIONS: none    CODE STATUS: full code    IMAGING: HISTORY  85 y/o female w/ a PMHx of HTN, HLD, osteoporosis, and cystocele requiring a pessary presented s/p fall in her bathroom from standing w/ possible loss of consciousness. 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 UA was also positive for large amounts of leukocyte esterase and many bacteria.     24 HOUR EVENTS: Patient's pain is well controlled and she continues to pull more than 1 L on incentive spirometry. MRI/MRA head & neck ordered to further evaluate punctate infarcts seen in her basal ganglia bilaterally but patient has a pessary that is not MRI-compatible so removal would be necessary in order to proceed. UA positive so urine culture sent and patient was started on a 3 day course of Bactrim.    SUBJECTIVE/ROS:  [x] A ten-point review of systems was otherwise negative except as noted.  [ ] Due to altered mental status/intubation, subjective information were not able to be obtained from the patient. History was obtained, to the extent possible, from review of the chart and collateral sources of information.    NEURO  CAM ICU: negative  Exam: awake, alert, oriented x4  Meds:  ·	lidocaine   Patch 1Patch Transdermal daily  ·	acetaminophen   Tablet 975milliGRAM(s) Oral every 6 hours  ·	ibuprofen  Tablet 400milliGRAM(s) Oral every 6 hours  ·	oxyCODONE IR 5milliGRAM(s) Oral every 4 hours PRN Moderate Pain (4 - 6)  ·	oxyCODONE IR 10milliGRAM(s) Oral every 4 hours PRN Severe Pain (7 - 10)  [x] Adequacy of sedation and pain control has been assessed and adjusted    RESPIRATORY  RR: 20 (17 - 30)  SpO2: 96% (94% - 97%)  Exam: unlabored, clear to auscultation bilaterally, right chest wall mildly tender to palpation  Mechanical Ventilation: no  [N/A] Extubation Readiness Assessed  Meds: none    CARDIOVASCULAR  HR: 61 (58 - 89)  BP: 118/57 (118/56 - 166/72)  BP(mean): 82 (81 - 104)  Exam: regular rate and rhythm  Cardiac Rhythm: sinus  Perfusion     [x]Adequate   [ ]Inadequate  Mentation    [x]Normal       [ ]Reduced  Extremities  [x]Warm         [ ]Cool  Volume Status [ ]Hypervolemic [x]Euvolemic [ ]Hypovolemic  Meds:  ·	amLODIPine   Tablet 5milliGRAM(s) Oral daily  ·	lisinopril 40milliGRAM(s) Oral daily    GI/NUTRITION  Exam: soft, nontender, nondistended  Diet: regular  Meds: none    GENITOURINARY  I & Os for 24h ending 23 Jun 2017 07:00  =============================================  IN:    Oral Fluid: 600 ml    Total IN: 600 ml  ---------------------------------------------  OUT:    Voided: 1400 ml    Total OUT: 1400 ml  ---------------------------------------------  Total NET: -800 ml    I & Os for current day (as of 23 Jun 2017 08:06)  =============================================  IN:    Total IN: 0 ml  ---------------------------------------------  OUT:    Voided: 300 ml    Total OUT: 300 ml  ---------------------------------------------  Total NET: -300 ml      139  |  106  |  21  ----------------------------<  103<H>  4.3   |  20<L>  |  1.04    Ca    9.2      23 Jun 2017 02:33  Phos  3.8  Mg     2.1  [ ] Whitlock catheter, indication: N/A  Meds: none    HEMATOLOGIC  Meds: enoxaparin Injectable 40milliGRAM(s) SubCutaneous daily  [x] VTE Prophylaxis                        13.5   8.6   )-----------( 178      ( 23 Jun 2017 02:33 )             41.6     INFECTIOUS DISEASES  WBC Count: 8.6 K/uL (06-23 @ 02:33)  WBC Count: 8.7 K/uL (06-22 @ 05:52)  RECENT CULTURES: urine (6/22) - pending  Meds: trimethoprim  160 mG/sulfamethoxazole 800 mG 1Tablet(s) Oral two times a day    ENDOCRINE  CAPILLARY BLOOD GLUCOSE: none  Meds: none    ACCESS DEVICES:  [x] Peripheral IV  [ ] Central Venous Line	[ ] R	[ ] L	[ ] IJ	[ ] Fem	[ ] SC	Placed:   [ ] Arterial Line		[ ] R	[ ] L	[ ] Fem	[ ] Rad	[ ] Ax	Placed:   [ ] PICC:					[ ] Mediport  [ ] Urinary Catheter, Date Placed:   [x] Necessity of urinary, arterial, and venous catheters discussed    OTHER MEDICATIONS: none    CODE STATUS: full code    IMAGING:

## 2017-06-23 NOTE — PROGRESS NOTE ADULT - PROBLEM SELECTOR PLAN 3
BP has been acceptable.  1. OK to continue norvasc and lisinopril as ordered with hold parameters.  2. Monitor for orthostatics and titrate PRN.

## 2017-06-23 NOTE — PROGRESS NOTE ADULT - PROBLEM SELECTOR PLAN 1
1. SICU care appreciated.  2. Telemetry monitoring.  3. Check TTE.  4. Would obtain MRI/MRA of head and neck to further classify bruit heard on exam and help in determination of chronicity of infarcts seen on CT head. Will need to clarify if pessary is MRI compatible; if not, would recommend removal by GYN. Alternatively, carotid duplex can be done, although this will not aid in classification of CVA seen on CT head, and is a less-ideal study if MRI can be done.  5. Neurology evaluation pending those results.  6. HbA1c is 5.9%. Lipid profile generally unrevealing.  7. Check orthostatics and titrate antihypertensives as needed.

## 2017-06-23 NOTE — PROGRESS NOTE ADULT - ASSESSMENT
86y Female w/ a PMHx of HTN, HLD, osteoporosis, and cystocele requiring a pessary presented s/p fall in her bathroom from standing w/ possible loss of consciousness. 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

## 2017-06-23 NOTE — PROGRESS NOTE ADULT - PROBLEM SELECTOR PLAN 2
Recommendation: Patient has minimal pain, oxygenating well, not tachycardic. Likely has small hemothorax as well based on CT imaging.  1. Incentive spirometry.  2. Multimodal pain control as ordered.  3. PT follow-up.  4. Add vitamin D supplement for fall prevention.

## 2017-06-23 NOTE — PROGRESS NOTE ADULT - PROBLEM SELECTOR PLAN 6
Given unclear etiology of syncope, not unreasonable to treat with short course of antimicrobials. Pessary can increase incidence of UTIs; it can also cause colonization, which may be what we're seeing here. WBC count is normal. Patient is afebrile.  1. Given overall clinical picture, would treat with small course of antibiotics. Can continue bactrim if tolerating.   2. If cultures with polymicrobial growth, this would support colonization further, in which case stopping antibiotics and observing is reasonable.  3. Monitor for diarrhea.

## 2017-06-23 NOTE — PROGRESS NOTE ADULT - ASSESSMENT
NEURO:  ·	Multimodal pain control w/ Lidoderm, Tylenol, ibuprofen, and oxycodone in the setting of acute rib fractures.  ·	MRI/MRA head & neck in order to evaluate punctate infarcts in basal ganglia bilaterally pending removal of pessary by gynecology but this can be done in an outpatient setting.  CV:  ·	Monitor vital signs.  ·	Continue home lisinopril and amlodipine for HTN.  RESP:  ·	Monitor pulse oximeter.  ·	Out of bed to chair and incentive spirometry to prevent atelectasis in the setting of rib fractures.  GI/NUTRITION  ·	Regular diet as tolerated.  RENAL:  ·	Monitor intake and output.  ·	Monitor electrolytes and replete as necessary.  HEME:  ·	Lovenox for VTE prophylaxis.  ID:  ·	Follow-up urine culture results.  ·	Bactrim for UTI in the setting of a positive UA and recent episode of falling associated with possible LOC  ENDO:  ·	Monitor glucose on BMP.  DISPOSITION:  ·	Full code  ·	Stable for transfer to floors or discharge home.      Alicia Au PA-C   [id number] 86y Female w/ a PMHx of HTN, HLD, osteoporosis, and cystocele requiring a pessary presented s/p fall in her bathroom from standing w/ possible loss of consciousness. 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    NEURO:  ·	Multimodal pain control w/ Lidoderm, Tylenol, ibuprofen, and oxycodone in the setting of acute rib fractures.  ·	MRI/MRA head & neck in order to evaluate punctate lacunar infarcts in basal ganglia bilaterally pending removal of pessary by gynecology but this can be done in an outpatient setting.  CV:  ·	Monitor vital signs.  ·	Continue home lisinopril and amlodipine for HTN.  RESP:  ·	Monitor pulse oximeter.  ·	Out of bed to chair and incentive spirometry to prevent atelectasis in the setting of rib fractures.  GI/NUTRITION  ·	Regular diet as tolerated.  RENAL:  ·	Monitor intake and output.  ·	Monitor electrolytes and replete as necessary.  HEME:  ·	Lovenox for VTE prophylaxis.  ID:  ·	Follow-up urine culture results.  ·	Bactrim for UTI in the setting of a positive UA and recent episode of falling associated with possible LOC  ENDO:  ·	Monitor glucose on BMP.  DISPOSITION:  ·	Full code  ·	Stable for transfer to floors or discharge home.      Alicia Au PA-C   [id number]

## 2017-06-23 NOTE — PROGRESS NOTE ADULT - ASSESSMENT
86 year old female with a PMHx significant for HTN, HLD, osteoporosis who presented to the ER after a fall. Per the patient, she was standing alone in her bathroom when she looked down towards the sink, and then she blacked out and fell to the ground. Syncope after changes in head position are always suspicious for orthostatic changes/transient changes in cerebral perfusion; however, exam notable for significant left sided carotid bruit and CT head with age indeterminate lacunar infarcts. Symptoms of transient vertigo can also be caused by a central process, but exam more consistent with BPPV, which may be exacerbated by transient hypotension in setting of polypharmacy.

## 2017-06-23 NOTE — PROGRESS NOTE ADULT - SUBJECTIVE AND OBJECTIVE BOX
Patient is a 86y old  Female who presents with a chief complaint of s/p fall (2017 00:20)    Seen at approximately 10:00 AM    SUBJECTIVE / OVERNIGHT EVENTS: Feels well. Pain is controlled. Has no new symptoms. Denies chest pain/SOB. No fever/chills. Ambulating.    MEDICATIONS  (STANDING):  acetaminophen   Tablet 975milliGRAM(s) Oral every 6 hours  ibuprofen  Tablet 400milliGRAM(s) Oral every 6 hours  lidocaine   Patch 1Patch Transdermal daily  amLODIPine   Tablet 5milliGRAM(s) Oral daily  lisinopril 40milliGRAM(s) Oral daily  enoxaparin Injectable 40milliGRAM(s) SubCutaneous daily  trimethoprim  160 mG/sulfamethoxazole 800 mG 1Tablet(s) Oral two times a day    MEDICATIONS  (PRN):  oxyCODONE IR 5milliGRAM(s) Oral every 4 hours PRN Moderate Pain (4 - 6)  oxyCODONE IR 10milliGRAM(s) Oral every 4 hours PRN Severe Pain (7 - 10)      Vital Signs Last 24 Hrs  T(C): 36.4, Max: 37.1 (06-23 @ 03:00)  HR: 83 (61 - 89)  BP: 135/62 (110/56 - 166/72)  RR: 28 (19 - 31)  SpO2: 95% (94% - 97%)  Wt(kg): --  CAPILLARY BLOOD GLUCOSE    I&O's Summary  I & Os for 24h ending 2017 07:00  =============================================  IN: 600 ml / OUT: 1400 ml / NET: -800 ml    I & Os for current day (as of 2017 16:36)  =============================================  IN: 540 ml / OUT: 500 ml / NET: 40 ml      PHYSICAL EXAM:  GENERAL: NAD, frail-appearing  HEAD:  Atraumatic, Normocephalic  EYES: EOMI, PERRLA, conjunctiva and sclera clear  NECK: Supple, No JVD. Left sided carotid bruit.  CHEST/LUNG: Clear to auscultation bilaterally; No wheeze  HEART: Regular rate and rhythm; No murmurs, rubs, or gallops  ABDOMEN: Soft, Nontender, Nondistended; Bowel sounds present  EXTREMITIES:  2+ Peripheral Pulses, No clubbing, cyanosis, or edema  PSYCH: AAOx3  NEUROLOGY: non-focal; 5/5 strength diffusely with exception of hips, which are 4/5 bilaterally.  SKIN: No rashes or lesions    LABS:                        13.5   8.6   )-----------( 178      ( 2017 02:33 )             41.6     06-    139  |  106  |  21  ----------------------------<  103<H>  4.3   |  20<L>  |  1.04    Ca    9.2      2017 02:33  Phos  3.8     -  Mg     2.1     -    TPro  7.6  /  Alb  4.1  /  TBili  0.4  /  DBili  x   /  AST  34  /  ALT  27  /  AlkPhos  58  06-21    PT/INR - ( 2017 05:52 )   PT: 11.4 sec;   INR: 1.05 ratio         PTT - ( 2017 05:52 )  PTT:34.8 sec  CARDIAC MARKERS ( 2017 20:48 )  x     / <0.01 ng/mL / 150 U/L / x     / 3.1 ng/mL      Urinalysis Basic - ( 2017 08:08 )    Color: Yellow / Appearance: SL Turbid / S.019 / pH: x  Gluc: x / Ketone: Negative  / Bili: Negative / Urobili: Negative   Blood: x / Protein: Trace / Nitrite: Positive   Leuk Esterase: Large / RBC: 2-5 /HPF / WBC >50 /HPF   Sq Epi: x / Non Sq Epi: x / Bacteria: Many /HPF        RADIOLOGY & ADDITIONAL TESTS:    Imaging Personally Reviewed: No new studies.    Consultant(s) Notes Reviewed:  Surgery    Care Discussed with Consultants/Other Providers: Dr. Nicholson, Dr. Sawyer

## 2017-06-23 NOTE — PROGRESS NOTE ADULT - PROBLEM SELECTOR PLAN 4
By history. Lipid profile unrevealing, however patient would benefit from at least moderate intensity statin given findings on CT head. Recommend simvastatin 20 mg PO qhs and outpatient f/u with PMD.

## 2017-06-24 VITALS
RESPIRATION RATE: 22 BRPM | OXYGEN SATURATION: 93 % | SYSTOLIC BLOOD PRESSURE: 117 MMHG | DIASTOLIC BLOOD PRESSURE: 56 MMHG | HEART RATE: 71 BPM

## 2017-06-24 LAB
-  AMIKACIN: SIGNIFICANT CHANGE UP
-  AMPICILLIN/SULBACTAM: SIGNIFICANT CHANGE UP
-  AMPICILLIN: SIGNIFICANT CHANGE UP
-  AZTREONAM: SIGNIFICANT CHANGE UP
-  CEFAZOLIN: SIGNIFICANT CHANGE UP
-  CEFEPIME: SIGNIFICANT CHANGE UP
-  CEFOXITIN: SIGNIFICANT CHANGE UP
-  CEFTAZIDIME: SIGNIFICANT CHANGE UP
-  CEFTRIAXONE: SIGNIFICANT CHANGE UP
-  CIPROFLOXACIN: SIGNIFICANT CHANGE UP
-  ERTAPENEM: SIGNIFICANT CHANGE UP
-  GENTAMICIN: SIGNIFICANT CHANGE UP
-  IMIPENEM: SIGNIFICANT CHANGE UP
-  LEVOFLOXACIN: SIGNIFICANT CHANGE UP
-  MEROPENEM: SIGNIFICANT CHANGE UP
-  NITROFURANTOIN: SIGNIFICANT CHANGE UP
-  PIPERACILLIN/TAZOBACTAM: SIGNIFICANT CHANGE UP
-  TOBRAMYCIN: SIGNIFICANT CHANGE UP
-  TRIMETHOPRIM/SULFAMETHOXAZOLE: SIGNIFICANT CHANGE UP
ANION GAP SERPL CALC-SCNC: 11 MMOL/L — SIGNIFICANT CHANGE UP (ref 5–17)
BUN SERPL-MCNC: 24 MG/DL — HIGH (ref 7–23)
CA-I BLD-SCNC: 1.17 MMOL/L — SIGNIFICANT CHANGE UP (ref 1.12–1.3)
CALCIUM SERPL-MCNC: 8.8 MG/DL — SIGNIFICANT CHANGE UP (ref 8.4–10.5)
CHLORIDE SERPL-SCNC: 106 MMOL/L — SIGNIFICANT CHANGE UP (ref 96–108)
CO2 SERPL-SCNC: 22 MMOL/L — SIGNIFICANT CHANGE UP (ref 22–31)
CREAT SERPL-MCNC: 1.05 MG/DL — SIGNIFICANT CHANGE UP (ref 0.5–1.3)
CULTURE RESULTS: SIGNIFICANT CHANGE UP
GLUCOSE SERPL-MCNC: 104 MG/DL — HIGH (ref 70–99)
HCT VFR BLD CALC: 39.1 % — SIGNIFICANT CHANGE UP (ref 34.5–45)
HGB BLD-MCNC: 13.4 G/DL — SIGNIFICANT CHANGE UP (ref 11.5–15.5)
MAGNESIUM SERPL-MCNC: 2.1 MG/DL — SIGNIFICANT CHANGE UP (ref 1.6–2.6)
MCHC RBC-ENTMCNC: 31.6 PG — SIGNIFICANT CHANGE UP (ref 27–34)
MCHC RBC-ENTMCNC: 34.2 GM/DL — SIGNIFICANT CHANGE UP (ref 32–36)
MCV RBC AUTO: 92.4 FL — SIGNIFICANT CHANGE UP (ref 80–100)
METHOD TYPE: SIGNIFICANT CHANGE UP
ORGANISM # SPEC MICROSCOPIC CNT: SIGNIFICANT CHANGE UP
ORGANISM # SPEC MICROSCOPIC CNT: SIGNIFICANT CHANGE UP
PHOSPHATE SERPL-MCNC: 3.2 MG/DL — SIGNIFICANT CHANGE UP (ref 2.5–4.5)
PLATELET # BLD AUTO: 169 K/UL — SIGNIFICANT CHANGE UP (ref 150–400)
POTASSIUM SERPL-MCNC: 3.9 MMOL/L — SIGNIFICANT CHANGE UP (ref 3.5–5.3)
POTASSIUM SERPL-SCNC: 3.9 MMOL/L — SIGNIFICANT CHANGE UP (ref 3.5–5.3)
RBC # BLD: 4.23 M/UL — SIGNIFICANT CHANGE UP (ref 3.8–5.2)
RBC # FLD: 12.5 % — SIGNIFICANT CHANGE UP (ref 10.3–14.5)
SODIUM SERPL-SCNC: 139 MMOL/L — SIGNIFICANT CHANGE UP (ref 135–145)
SPECIMEN SOURCE: SIGNIFICANT CHANGE UP
WBC # BLD: 9.9 K/UL — SIGNIFICANT CHANGE UP (ref 3.8–10.5)
WBC # FLD AUTO: 9.9 K/UL — SIGNIFICANT CHANGE UP (ref 3.8–10.5)

## 2017-06-24 PROCEDURE — 96374 THER/PROPH/DIAG INJ IV PUSH: CPT

## 2017-06-24 PROCEDURE — 93306 TTE W/DOPPLER COMPLETE: CPT

## 2017-06-24 PROCEDURE — 71045 X-RAY EXAM CHEST 1 VIEW: CPT

## 2017-06-24 PROCEDURE — 93005 ELECTROCARDIOGRAM TRACING: CPT

## 2017-06-24 PROCEDURE — 84484 ASSAY OF TROPONIN QUANT: CPT

## 2017-06-24 PROCEDURE — 83735 ASSAY OF MAGNESIUM: CPT

## 2017-06-24 PROCEDURE — 99285 EMERGENCY DEPT VISIT HI MDM: CPT | Mod: 25

## 2017-06-24 PROCEDURE — 85730 THROMBOPLASTIN TIME PARTIAL: CPT

## 2017-06-24 PROCEDURE — 83036 HEMOGLOBIN GLYCOSYLATED A1C: CPT

## 2017-06-24 PROCEDURE — 84100 ASSAY OF PHOSPHORUS: CPT

## 2017-06-24 PROCEDURE — 80048 BASIC METABOLIC PNL TOTAL CA: CPT

## 2017-06-24 PROCEDURE — 81001 URINALYSIS AUTO W/SCOPE: CPT

## 2017-06-24 PROCEDURE — 71100 X-RAY EXAM RIBS UNI 2 VIEWS: CPT

## 2017-06-24 PROCEDURE — 70450 CT HEAD/BRAIN W/O DYE: CPT

## 2017-06-24 PROCEDURE — 87186 SC STD MICRODIL/AGAR DIL: CPT

## 2017-06-24 PROCEDURE — 82330 ASSAY OF CALCIUM: CPT

## 2017-06-24 PROCEDURE — 80061 LIPID PANEL: CPT

## 2017-06-24 PROCEDURE — 93306 TTE W/DOPPLER COMPLETE: CPT | Mod: 26

## 2017-06-24 PROCEDURE — 71250 CT THORAX DX C-: CPT

## 2017-06-24 PROCEDURE — 93880 EXTRACRANIAL BILAT STUDY: CPT

## 2017-06-24 PROCEDURE — 82553 CREATINE MB FRACTION: CPT

## 2017-06-24 PROCEDURE — 99232 SBSQ HOSP IP/OBS MODERATE 35: CPT

## 2017-06-24 PROCEDURE — 72170 X-RAY EXAM OF PELVIS: CPT

## 2017-06-24 PROCEDURE — 71010: CPT | Mod: 26

## 2017-06-24 PROCEDURE — 87086 URINE CULTURE/COLONY COUNT: CPT

## 2017-06-24 PROCEDURE — 82550 ASSAY OF CK (CPK): CPT

## 2017-06-24 PROCEDURE — 85027 COMPLETE CBC AUTOMATED: CPT

## 2017-06-24 PROCEDURE — 85610 PROTHROMBIN TIME: CPT

## 2017-06-24 PROCEDURE — 80053 COMPREHEN METABOLIC PANEL: CPT

## 2017-06-24 RX ORDER — AZTREONAM 2 G
1 VIAL (EA) INJECTION
Qty: 2 | Refills: 0 | OUTPATIENT
Start: 2017-06-24 | End: 2017-06-25

## 2017-06-24 RX ORDER — LIDOCAINE 4 G/100G
1 CREAM TOPICAL
Qty: 7 | Refills: 0
Start: 2017-06-24 | End: 2017-07-01

## 2017-06-24 RX ORDER — OXYCODONE HYDROCHLORIDE 5 MG/1
1 TABLET ORAL
Qty: 20 | Refills: 0 | OUTPATIENT
Start: 2017-06-24

## 2017-06-24 RX ORDER — POTASSIUM CHLORIDE 20 MEQ
20 PACKET (EA) ORAL ONCE
Qty: 0 | Refills: 0 | Status: COMPLETED | OUTPATIENT
Start: 2017-06-24 | End: 2017-06-24

## 2017-06-24 RX ORDER — LIDOCAINE 4 G/100G
1 CREAM TOPICAL
Qty: 7 | Refills: 0 | OUTPATIENT
Start: 2017-06-24 | End: 2017-07-01

## 2017-06-24 RX ORDER — ACETAMINOPHEN 500 MG
3 TABLET ORAL
Qty: 0 | Refills: 0 | DISCHARGE
Start: 2017-06-24

## 2017-06-24 RX ADMIN — Medication 975 MILLIGRAM(S): at 05:11

## 2017-06-24 RX ADMIN — LISINOPRIL 40 MILLIGRAM(S): 2.5 TABLET ORAL at 05:11

## 2017-06-24 RX ADMIN — LIDOCAINE 1 PATCH: 4 CREAM TOPICAL at 12:10

## 2017-06-24 RX ADMIN — Medication 975 MILLIGRAM(S): at 12:10

## 2017-06-24 RX ADMIN — AMLODIPINE BESYLATE 5 MILLIGRAM(S): 2.5 TABLET ORAL at 05:11

## 2017-06-24 RX ADMIN — Medication 20 MILLIEQUIVALENT(S): at 05:51

## 2017-06-24 RX ADMIN — ENOXAPARIN SODIUM 40 MILLIGRAM(S): 100 INJECTION SUBCUTANEOUS at 12:10

## 2017-06-24 RX ADMIN — Medication 1 TABLET(S): at 05:11

## 2017-06-24 NOTE — DISCHARGE NOTE ADULT - PLAN OF CARE
Minimal pain, return to baseline function DIET: No restrictions  ACTIVITY: Ambulate as tolerated. Do NOT drive while taking narcotics.  FOLLOW UP: Please see below.  Please contact your surgeon IF: you develop a fever of 100.4F(38C) or greater, your pain is uncontrolled on medications, you develop unremitting nausea or vomiting, or for any other concerns.

## 2017-06-24 NOTE — CONSULT NOTE ADULT - SUBJECTIVE AND OBJECTIVE BOX
Neurology Consult    Name  GEORGIA CRAIG    86 year old female with HTN admitted to the SICU for sustaining multiple rib fractures after a fall in the bathroom one morning. Patient denies any current weakness and denies any stroke in the past to her knowledge. Son attributes the fall to being very nervous for her doctor appointment that day. Patient does take multiple blood pressure medications and states that she sometimes feels lightheaded from this. Is currently doing very well and anticipating d/c later this afternoon.                                                          MEDICATIONS  (STANDING):  acetaminophen   Tablet 975milliGRAM(s) Oral every 6 hours  lidocaine   Patch 1Patch Transdermal daily  amLODIPine   Tablet 5milliGRAM(s) Oral daily  lisinopril 40milliGRAM(s) Oral daily  enoxaparin Injectable 40milliGRAM(s) SubCutaneous daily  trimethoprim  160 mG/sulfamethoxazole 800 mG 1Tablet(s) Oral two times a day    MEDICATIONS  (PRN):  oxyCODONE IR 5milliGRAM(s) Oral every 4 hours PRN Moderate Pain (4 - 6)  oxyCODONE IR 10milliGRAM(s) Oral every 4 hours PRN Severe Pain (7 - 10)      Allergies    No Known Allergies    Intolerances        Objective  Vital Signs Last 24 Hrs  T(C): 37.2, Max: 37.2 (06-24 @ 11:00)  T(F): 99, Max: 99 (06-24 @ 11:00)  HR: 72 (62 - 101)  BP: 124/60 (107/58 - 159/70)  BP(mean): 87 (78 - 101)  RR: 29 (18 - 45)  SpO2: 95% (94% - 97%)    General Exam   General appearance: No acute distress, well-nourished  Respiratory:    non-labored respirations               Neurological Exam  Mental Status:  alert and oriented x3, fluent speech, following commands, repetition and naming intact    Cranial Nerves: PERRL, EOMI without nystagmus, visual fields intact no facial droop, no dysarthria    Motor:   Tone:   normal               Strength:  Upper extremity                          Delt       Bicep    Tricep                                                  R         5/5        5/5        5/5       5/5                                               L          5/5        5/5        5/5      5/5    Lower extremity                           HF          KE          KF        DF         PF                                               R        5/5        5/5        5/5       5/5       5/5                                               L         5/5        5/5        5/5      5/5        5/5    Pronator drift:   none           Dysmetria: none with finger-to-nose testing  Tremor:  none appreciated at rest or in action    Sensation: intact grossly to light touch    Deep Tendon Reflexes:   Toes flexor bilaterally ________    Gait:     Other Studies    06-24    139  |  106  |  24<H>  ----------------------------<  104<H>  3.9   |  22  |  1.05    Ca    8.8      24 Jun 2017 04:21  Phos  3.2     06-24  Mg     2.1     06-24 06-24    139  |  106  |  24<H>  ----------------------------<  104<H>  3.9   |  22  |  1.05    Ca    8.8      24 Jun 2017 04:21  Phos  3.2     06-24  Mg     2.1     06-24          Radiology    CTh:   No acute intracranial hemorrhage, mass effect, or CT evidence of an acute  transcortical infarct.    Mild to moderate chronic ischemic changes in the frontoparietal white matter  and indeterminate age punctate lacunar infarcts in the bilateral basal  ganglia.

## 2017-06-24 NOTE — PROGRESS NOTE ADULT - SUBJECTIVE AND OBJECTIVE BOX
ACS DAILY PROGRESS NOTE    HOSPITAL DAY: 3    INTERVAL EVENTS:     SUBJECTIVE: Patient OOB to chair, son at bedside. Patient without complaints this morning; pain well controlled.     OBJECTIVE:   Vital Signs Last 24 Hrs  T(C): 36.8, Max: 37 (06-23 @ 23:00)  T(F): 98.2, Max: 98.6 (06-23 @ 23:00)  HR: 72 (62 - 101)  BP: 120/61 (107/58 - 159/70)  BP(mean): 85 (80 - 101)  RR: 22 (18 - 45)  SpO2: 94% (94% - 97%)    I&O's Detail    I & Os for current day (as of 24 Jun 2017 08:37)  =============================================  IN:    Oral Fluid: 1260 ml    Total IN: 1260 ml  ---------------------------------------------  OUT:    Voided: 1325 ml    Total OUT: 1325 ml  ---------------------------------------------  Total NET: -65 ml      Gen: NAD, AOx3  Pulm: Normal respiratory pattern, pulling 1000mL on IS  Abd: Soft, NT, ND    CBC Full  -  ( 24 Jun 2017 04:21 )  WBC Count : 9.9 K/uL  Hemoglobin : 13.4 g/dL  Hematocrit : 39.1 %  Platelet Count - Automated : 169 K/uL  Mean Cell Volume : 92.4 fl  Mean Cell Hemoglobin : 31.6 pg  Mean Cell Hemoglobin Concentration : 34.2 gm/dL  Auto Neutrophil # : x  Auto Lymphocyte # : x  Auto Monocyte # : x  Auto Eosinophil # : x  Auto Basophil # : x  Auto Neutrophil % : x  Auto Lymphocyte % : x  Auto Monocyte % : x  Auto Eosinophil % : x  Auto Basophil % : x    06-24    139  |  106  |  24<H>  ----------------------------<  104<H>  3.9   |  22  |  1.05    Ca    8.8      24 Jun 2017 04:21  Phos  3.2     06-24  Mg     2.1     06-24

## 2017-06-24 NOTE — CONSULT NOTE ADULT - ASSESSMENT
86 year old female with recent fall and CT head showing chronic infarcts and age-indeterminate punctate infarcts. Fall likely not s/t to CVA. Possibly s/t polypharmacy. Patient unable to undergo MRI.

## 2017-06-24 NOTE — DISCHARGE NOTE ADULT - CARE PROVIDERS DIRECT ADDRESSES
,[email or web address]

## 2017-06-24 NOTE — DISCHARGE NOTE ADULT - CARE PROVIDER_API CALL
Cholo Esparza (), Neurology  [address] [place] [address]  Phone: [phone]  Fax: [phone]    Evelin Nicholson), Surgery  Critical Care  [address] [place] [id number]  Phone: [phone]  Fax: [phone] Cholo Esparza (DO), Neurology  [address] [place] [address]  Phone: [phone]  Fax: [phone]

## 2017-06-24 NOTE — DISCHARGE NOTE ADULT - HOSPITAL COURSE
86 year old female presented after a fall in her bathroom with resulting Left 3-8 rib fractures. On admission, she was hemodynamically appropriate and able to take in 1000cc on the incentive spirometer. GCS 15. She was not in respiratory distress. She was admitted with multiple left sided rib fractures with multimodal pain management: Tylenol, ibuprofen and lidocaine patch.    Neurology evaluated the patient given the CT head showing chronic infarcts and age-indeterminate punctuate infarcts. The patient was unable to undergo an MRI due to a history of a metal foreign body. Neurology determined that her fall was likely not secondary to a CVA, but possibly due to polypharmacy. She underwent a bilateral carotid duplex that showed a 50-69% stenosis (likely closer to 69% stenosis) in the proximal right internal carotid artery, but without evidence of significant stenosis of the left internal carotid artery, and stenosis of the right external carotid artery. She will follow up with neurology as an outpatient for a further syncopal workup.     On the day of discharge, patient was tolerating a regular diet, ambulating without assistance, and her pain was well controlled with oral medications. Patient and family were comfortable with her discharge home and understood her follow up information.

## 2017-06-24 NOTE — DISCHARGE NOTE ADULT - OTHER SIGNIFICANT FINDINGS
EXAM:  CAROTID DUPLEX BILATERAL  PROCEDURE DATE:  06/23/2017      INTERPRETATION:  Clinical information: 86-year-old with history of   syncope. Assess for carotid stenosis.    Technique: Grayscale, color and spectral Doppler examination of both   carotid arteries was performed.     There is bilateral intimal thickening. Irregular calcified plaque is seen   in the right carotid bifurcation extending into the internal and external   carotid arteries. Calcified plaque is also seen in the left carotid   bifurcation extending into the internal carotid artery. Blood flow   velocities are as follows:    RIGHT:    PROX CCA = 120 ;  DIST CCA = 64 ;  PROX ICA = 225 ;  DIST ICA =   114 ;  ECA = 197    LEFT   :    PROX CCA = 76 ;  DIST CCA = 77 ;  PROX ICA = 79 ;  DIST ICA =   85 ;  ECA = 100    Both vertebral arteries demonstrate antegrade flow.    Impression: Bilateral irregular calcified plaque, right greater than   left. Ulceration cannot be excluded.    Findings consistent with 50-69% stenosis (likely closer to 69% stenosis)   in the proximal right internal carotid artery. No evidence of significant   stenosis of the left internal carotid artery. Stenosis of the right   external carotid artery. EVONNE Lorenzo was informed of the findings   following the study on 6/23/2017.    Measurement of carotid stenosis is based on velocity parameters that   correlate the residual internal carotid diameter with that of the more   distal vessel in accordance with a method such as the [place] American   Symptomatic Carotid Endarterectomy Trial (NASCET).      EXAM:  CT CHEST   PROCEDURE DATE:  06/21/2017    INTERPRETATION:  CLINICAL INFORMATION: Multiple rib fractures.  COMPARISON: None available.  PROCEDURE:   CT of the Chest was performed without intravenous contrast.  Sagittal, coronal, and MIP reformats were performed.  FINDINGS:  CHEST:   LUNGS AND LARGE AIRWAYS: Patent central airways. Left upper lobe   calcified granuloma and a 2 x 1.2 cm upper lobe calcified nodule with   scarring.  PLEURA: Trace right pleural effusion. No pneumothorax.  VESSELS: Severe atherosclerotic calcification of the thoracic aorta and   proximal great vessels. Normal caliber thoracic aorta and main pulmonary   artery.  HEART: Normal-size heart without pericardial effusion. Coronary artery   calcifications.  MEDIASTINUM AND JARED: No lymphadenopathy.  CHEST WALL AND LOWER NECK: Within normal limits.  VISUALIZED UPPER ABDOMEN: Bilateral renal cysts.  BONES: Acute fractures of the right fourth through eighth ribs fractured   at 2 sites and acute fracture of the right anterior third rib; several of   the fractures are mildly displaced. Several chronic appearing left-sided   rib fractures. There is a chronic appearing deformity of the right   scapula.  IMPRESSION:   Acute fractures of the right fourth through eighth ribs fractured at 2   sites and acute fracture of the right anterior third rib; several   fractures are mildly displaced. Trace right pleural effusion.

## 2017-06-24 NOTE — DISCHARGE NOTE ADULT - CARE PLAN
Principal Discharge DX:	Closed fracture of multiple ribs of right side, initial encounter  Goal:	Minimal pain, return to baseline function  Instructions for follow-up, activity and diet:	DIET: No restrictions  ACTIVITY: Ambulate as tolerated. Do NOT drive while taking narcotics.  FOLLOW UP: Please see below.  Please contact your surgeon IF: you develop a fever of 100.4F(38C) or greater, your pain is uncontrolled on medications, you develop unremitting nausea or vomiting, or for any other concerns.

## 2017-06-24 NOTE — DISCHARGE NOTE ADULT - MEDICATION SUMMARY - MEDICATIONS TO TAKE
I will START or STAY ON the medications listed below when I get home from the hospital:    oxyCODONE 5 mg oral tablet  -- 1 tab(s) by mouth every 6 hours MDD:4  -- Caution federal law prohibits the transfer of this drug to any person other  than the person for whom it was prescribed.  It is very important that you take or use this exactly as directed.  Do not skip doses or discontinue unless directed by your doctor.  May cause drowsiness.  Alcohol may intensify this effect.  Use care when operating dangerous machinery.  This prescription cannot be refilled.  Using more of this medication than prescribed may cause serious breathing problems.    -- Indication: For Closed fracture of multiple ribs of right side, initial encounter    acetaminophen 325 mg oral tablet  -- 3 tab(s) by mouth every 6 hours  -- Indication: For Closed fracture of multiple ribs of right side, initial encounter    ramipril 5 mg oral capsule  -- 1 cap(s) by mouth once a day  -- Indication: For HTN (hypertension)    simvastatin 10 mg oral tablet  -- 1 tab(s) by mouth once a day (at bedtime)  -- Indication: For Hyperlipidemia, unspecified hyperlipidemia type    alendronate 70 mg oral tablet  -- 1 tab(s) by mouth once a week  -- Indication: For Osteoporosis    Norvasc 5 mg oral tablet  -- 1 tab(s) by mouth once a day  -- Indication: For Essential hypertension I will START or STAY ON the medications listed below when I get home from the hospital:    oxyCODONE 5 mg oral tablet  -- 1 tab(s) by mouth every 6 hours MDD:4  -- Caution federal law prohibits the transfer of this drug to any person other  than the person for whom it was prescribed.  It is very important that you take or use this exactly as directed.  Do not skip doses or discontinue unless directed by your doctor.  May cause drowsiness.  Alcohol may intensify this effect.  Use care when operating dangerous machinery.  This prescription cannot be refilled.  Using more of this medication than prescribed may cause serious breathing problems.    -- Indication: For Closed fracture of multiple ribs of right side, initial encounter    acetaminophen 325 mg oral tablet  -- 3 tab(s) by mouth every 6 hours  -- Indication: For Closed fracture of multiple ribs of right side, initial encounter    ramipril 5 mg oral capsule  -- 1 cap(s) by mouth once a day  -- Indication: For HTN (hypertension)    simvastatin 10 mg oral tablet  -- 1 tab(s) by mouth once a day (at bedtime)  -- Indication: For Hyperlipidemia, unspecified hyperlipidemia type    alendronate 70 mg oral tablet  -- 1 tab(s) by mouth once a week  -- Indication: For Osteoporosis    Norvasc 5 mg oral tablet  -- 1 tab(s) by mouth once a day  -- Indication: For Essential hypertension    Lidoderm 5% topical film  -- Apply on skin to affected area once a day  -- For external use only.  Remove old patch prior to applying a new patch.    -- Indication: For Closed fracture of multiple ribs of right side, initial encounter    Bactrim  mg-160 mg oral tablet  -- 1 tab(s) by mouth 2 times a day. Stop taking on 6/25  -- Avoid prolonged or excessive exposure to direct and/or artificial sunlight while taking this medication.  Finish all this medication unless otherwise directed by prescriber.  Medication should be taken with plenty of water.    -- Indication: For Urinary tract infection

## 2017-06-24 NOTE — PROGRESS NOTE ADULT - SUBJECTIVE AND OBJECTIVE BOX
87 y/o female w/ a PMHx of HTN, HLD, osteoporosis, and cystocele requiring a pessary presented s/p fall in her bathroom from standing w/ possible loss of consciousness. 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 UA was also positive for large amounts of leukocyte esterase and many bacteria.    Patient's pain is well controlled and she continues to pull more than 1 L on incentive spirometry. Unable to obtain MRI/MRA head & neck as patient has a sewing needle stuck in her body that is not MRI compatible (see 6/21 chest CT series 5, images 15-18). Carotid Duplex obtained demonstrating 50-69% stenosis of the right internal carotid artery and stenosis of the right external carotid artery. Awaiting further recommendations from neurology regarding her punctate lacunar infarcts. Has been listed for the floor for the last 24 hrs. HISTORY  85 y/o female w/ a PMHx of HTN, HLD, osteoporosis, and cystocele requiring a pessary presented s/p fall in her bathroom from standing w/ possible loss of consciousness. 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 UA was also positive for large amounts of leukocyte esterase and many bacteria.    24 HOUR EVENTS: Patient's pain is well controlled and she continues to pull more than 1 L on incentive spirometry. Unable to obtain MRI/MRA head & neck as patient has a sewing needle stuck in her body that is not MRI compatible (see 6/21 chest CT series 5, images 15-18). Carotid Duplex obtained demonstrating 50-69% stenosis of the right internal carotid artery and stenosis of the right external carotid artery. Awaiting further recommendations from neurology regarding her punctate lacunar infarcts. Has been listed for the floor for the last 24 hrs.    SUBJECTIVE/ROS:  [x] A ten-point review of systems was otherwise negative except as noted.  [ ] Due to altered mental status/intubation, subjective information were not able to be obtained from the patient. History was obtained, to the extent possible, from review of the chart and collateral sources of information.    NEURO  GCS: 15    CAM ICU: negative  Exam: awake, alert, oriented x4  Meds:  ·	lidocaine   Patch 1Patch Transdermal daily  ·	acetaminophen   Tablet 975milliGRAM(s) Oral every 6 hours  ·	oxyCODONE IR 5milliGRAM(s) Oral every 4 hours PRN Moderate Pain (4 - 6)  ·	oxyCODONE IR 10milliGRAM(s) Oral every 4 hours PRN Severe Pain (7 - 10)  [x] Adequacy of sedation and pain control has been assessed and adjusted    RESPIRATORY  RR: 18 (18 - 45)  SpO2: 95% (94% - 97%)  Exam: unlabored, clear to auscultation bilaterally, right chest wall tender to palpation  Mechanical Ventilation: none  [N/A] Extubation Readiness Assessed  Meds: none    CARDIOVASCULAR  HR: 62 (62 - 101)  BP: 155/67 (107/58 - 159/70)  BP(mean): 96 (80 - 102)  Exam: regular rate and rhythm  Cardiac Rhythm: sinus  Perfusion     [x]Adequate   [ ]Inadequate  Mentation    [x]Normal       [ ]Reduced  Extremities  [x]Warm         [ ]Cool  Volume Status [ ]Hypervolemic [x]Euvolemic [ ]Hypovolemic  Meds:  ·	amLODIPine   Tablet 5milliGRAM(s) Oral daily  ·	lisinopril 40milliGRAM(s) Oral daily    GI/NUTRITION  Exam: soft, nontender, nondistended  Diet: regular diet  Meds: none    GENITOURINARY      139  |  106  |  24<H>  ----------------------------<  104<H>  3.9   |  22  |  1.05    Ca    8.8      24 Jun 2017 04:21  Phos  3.2  Mg     2.1    [ ] Whitlock catheter, indication: N/A  Meds: none    HEMATOLOGIC  Meds: enoxaparin Injectable 40milliGRAM(s) SubCutaneous daily  [x] VTE Prophylaxis                        13.4   9.9   )-----------( 169      ( 24 Jun 2017 04:21 )             39.1     INFECTIOUS DISEASES  WBC Count: 9.9 K/uL (06-24 @ 04:21)    RECENT CULTURES:  Specimen Source: .Urine Clean Catch (Midstream)  Date/Time: 06-22 @ 12:32  Culture Results:   >100,000 CFU/ml Gram Negative Rods  Gram Stain: --  Organism: --    Meds: trimethoprim  160 mG/sulfamethoxazole 800 mG 1Tablet(s) Oral two times a day    ENDOCRINE  CAPILLARY BLOOD GLUCOSE: none  Meds: none      ACCESS DEVICES:  [x] Peripheral IV  [ ] Central Venous Line	[ ] R	[ ] L	[ ] IJ	[ ] Fem	[ ] SC	Placed:   [ ] Arterial Line		[ ] R	[ ] L	[ ] Fem	[ ] Rad	[ ] Ax	Placed:   [ ] PICC:					[ ] Mediport  [ ] Urinary Catheter, Date Placed:   [x] Necessity of urinary, arterial, and venous catheters discussed    OTHER MEDICATIONS: none    CODE STATUS: full code    IMAGING: HISTORY  85 y/o female w/ a PMHx of HTN, HLD, osteoporosis, and cystocele requiring a pessary presented s/p fall in her bathroom from standing w/ possible loss of consciousness. 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 UA was also positive for large amounts of leukocyte esterase and many bacteria.    24 HOUR EVENTS: Patient's pain is well controlled and she continues to pull more than 1 L on incentive spirometry. Unable to obtain MRI/MRA head & neck as patient has a sewing needle stuck in her body that is not MRI compatible (see 6/21 chest CT series 5, images 15-18). Carotid Duplex obtained demonstrating 50-69% stenosis of the right internal carotid artery and stenosis of the right external carotid artery. Awaiting further recommendations from neurology regarding her punctate lacunar infarcts. Has been listed for the floor for the last 24 hrs.    SUBJECTIVE/ROS:  [x] A ten-point review of systems was otherwise negative except as noted.  [ ] Due to altered mental status/intubation, subjective information were not able to be obtained from the patient. History was obtained, to the extent possible, from review of the chart and collateral sources of information.    NEURO  GCS: 15    CAM ICU: negative  Exam: awake, alert, oriented x4  Meds:  ·	lidocaine   Patch 1Patch Transdermal daily  ·	acetaminophen   Tablet 975milliGRAM(s) Oral every 6 hours  ·	oxyCODONE IR 5milliGRAM(s) Oral every 4 hours PRN Moderate Pain (4 - 6)  ·	oxyCODONE IR 10milliGRAM(s) Oral every 4 hours PRN Severe Pain (7 - 10)  [x] Adequacy of sedation and pain control has been assessed and adjusted    RESPIRATORY  RR: 18 (18 - 45)  SpO2: 95% (94% - 97%)  Exam: unlabored, clear to auscultation bilaterally, right chest wall tender to palpation  Mechanical Ventilation: none  [N/A] Extubation Readiness Assessed  Meds: none    CARDIOVASCULAR  HR: 62 (62 - 101)  BP: 155/67 (107/58 - 159/70)  BP(mean): 96 (80 - 102)  Exam: regular rate and rhythm  Cardiac Rhythm: sinus  Perfusion     [x]Adequate   [ ]Inadequate  Mentation    [x]Normal       [ ]Reduced  Extremities  [x]Warm         [ ]Cool  Volume Status [ ]Hypervolemic [x]Euvolemic [ ]Hypovolemic  Meds:  ·	amLODIPine   Tablet 5milliGRAM(s) Oral daily  ·	lisinopril 40milliGRAM(s) Oral daily    GI/NUTRITION  Exam: soft, nontender, nondistended  Diet: regular diet  Meds: none    GENITOURINARY  I&O's Detail    I & Os for current day (as of 06-24 @ 07:15)  =============================================  IN:    Oral Fluid: 1260 ml    Total IN: 1260 ml  ---------------------------------------------  OUT:    Voided: 1325 ml    Total OUT: 1325 ml  ---------------------------------------------  Total NET: -65 ml    139  |  106  |  24<H>  ----------------------------<  104<H>  3.9   |  22  |  1.05    Ca    8.8      24 Jun 2017 04:21  Phos  3.2  Mg     2.1    [ ] Whitlock catheter, indication: N/A  Meds: none    HEMATOLOGIC  Meds: enoxaparin Injectable 40milliGRAM(s) SubCutaneous daily  [x] VTE Prophylaxis                        13.4   9.9   )-----------( 169      ( 24 Jun 2017 04:21 )             39.1     INFECTIOUS DISEASES  WBC Count: 9.9 K/uL (06-24 @ 04:21)    RECENT CULTURES:  Specimen Source: .Urine Clean Catch (Midstream)  Date/Time: 06-22 @ 12:32  Culture Results:   >100,000 CFU/ml Gram Negative Rods  Gram Stain: --  Organism: --    Meds: trimethoprim  160 mG/sulfamethoxazole 800 mG 1Tablet(s) Oral two times a day    ENDOCRINE  CAPILLARY BLOOD GLUCOSE: none  Meds: none      ACCESS DEVICES:  [x] Peripheral IV  [ ] Central Venous Line	[ ] R	[ ] L	[ ] IJ	[ ] Fem	[ ] SC	Placed:   [ ] Arterial Line		[ ] R	[ ] L	[ ] Fem	[ ] Rad	[ ] Ax	Placed:   [ ] PICC:					[ ] Mediport  [ ] Urinary Catheter, Date Placed:   [x] Necessity of urinary, arterial, and venous catheters discussed    OTHER MEDICATIONS: none    CODE STATUS: full code    IMAGING:

## 2017-06-24 NOTE — DISCHARGE NOTE ADULT - ADDITIONAL INSTRUCTIONS
1. Follow up with neurology outpatient. Dr. Esparza at [address] [place] [address] [phone] at earliest convenience. 1. Follow up with neurology outpatient. Dr. Esparza at [address] [facility] [phone] at earliest convenience.  2. Please follow up with your trauma surgeon, Dr. Nicholson, within one to two weeks.   3. Please follow up with your primary care physician regarding your recent hospitalization. 1. Follow up with neurology outpatient. Dr. Esparza at [address] [facility] [phone] at earliest convenience.  2. Please follow up with your primary care physician regarding your recent hospitalization.

## 2017-06-24 NOTE — PROGRESS NOTE ADULT - ASSESSMENT
86y Female w/ a PMHx of HTN, HLD, osteoporosis, and cystocele requiring a pessary presented s/p fall in her bathroom from standing w/ possible loss of consciousness. 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    - c/w multimodal pain control  - Carotid duplex showing 50-69% stenosis of R ICA, therefore no indication for CEA  - Reg diet as tolerated  - VTE prophylaxis with lovenox  - continue with abx for UTI through 6/25  - Stable for transfer to the floor    BRUCE Lorenzo PGY1  Pager: [phone]

## 2017-06-24 NOTE — PROGRESS NOTE ADULT - ASSESSMENT
87 y/o female SICU day #3 w/ a PMHx of HTN, HLD, osteoporosis, and cystocele requiring a pessary presented s/p fall in her bathroom from standing w/ possible loss of consciousness. 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    NEURO:  Multimodal pain control w/ Lidoderm, Tylenol, ibuprofen, and oxycodone in the setting of acute rib fractures.  MRI/MRA head & neck in order to evaluate punctate lacunar infarcts in basal ganglia bilaterally pending removal of pessary by gynecology but this can be done in an outpatient setting.  CV:  Monitor vital signs.  Continue home lisinopril and amlodipine for HTN.  RESP:  Monitor pulse oximeter.  Out of bed to chair and incentive spirometry to prevent atelectasis in the setting of rib fractures.  GI/NUTRITION  Regular diet as tolerated.  RENAL:  Monitor intake and output.  Monitor electrolytes and replete as necessary.  HEME:  Lovenox for VTE prophylaxis.  ID:  Follow-up urine culture results.  Bactrim for UTI in the setting of a positive UA and recent episode of falling associated with possible LOC  ENDO:  Monitor glucose on BMP.  DISPOSITION:  Full code  Stable for transfer to floors or discharge home.      Alicia Au PA-C   [id number] 87 y/o female SICU day #3 w/ a PMHx of HTN, HLD, osteoporosis, and cystocele requiring a pessary presented s/p fall in her bathroom from standing w/ possible loss of consciousness. 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    NEURO:  ·	Multimodal pain control w/ Lidoderm, Tylenol, ibuprofen, and oxycodone in the setting of acute rib fractures.  ·	Neuro checks q4hrs and follow-up neurology regarding CT head and carotid Duplex findings.  CV:  ·	Monitor vital signs.  ·	Continue home lisinopril and amlodipine for HTN - titrate as needed  RESP:  ·	Monitor pulse oximeter.  ·	Out of bed to chair, incentive spirometry, and ambulate as tolerated to prevent atelectasis in the setting of rib fractures.  GI/NUTRITION  ·	Regular diet as tolerated.  RENAL:  ·	Monitor intake and output.  ·	Monitor electrolytes and replete as necessary.  HEME:  ·	Lovenox for VTE prophylaxis.  ID:  ·	Follow-up urine culture results.  ·	Bactrim for UTI in the setting of a positive UA and recent episode of falling associated with possible LOC  ENDO:  ·	Monitor glucose on BMP.  DISPOSITION:  ·	Full code  ·	Stable for transfer to floors or discharge home.      Alicia Au PA-C   [id number]

## 2017-06-25 RX ORDER — LIDOCAINE 4 G/100G
1 CREAM TOPICAL
Qty: 1 | Refills: 0
Start: 2017-06-25 | End: 2017-06-30

## 2017-06-25 RX ORDER — LIDOCAINE 4 G/100G
1 CREAM TOPICAL
Qty: 1 | Refills: 0 | OUTPATIENT
Start: 2017-06-25 | End: 2017-06-30

## 2017-06-25 RX ORDER — OXYCODONE HYDROCHLORIDE 5 MG/1
1 TABLET ORAL
Qty: 20 | Refills: 0
Start: 2017-06-25

## 2017-06-29 PROBLEM — Z00.00 ENCOUNTER FOR PREVENTIVE HEALTH EXAMINATION: Status: ACTIVE | Noted: 2017-06-29

## 2017-06-29 PROBLEM — M81.0 AGE-RELATED OSTEOPOROSIS WITHOUT CURRENT PATHOLOGICAL FRACTURE: Chronic | Status: ACTIVE | Noted: 2017-06-21

## 2017-06-29 PROBLEM — E78.00 PURE HYPERCHOLESTEROLEMIA, UNSPECIFIED: Chronic | Status: ACTIVE | Noted: 2017-06-21

## 2017-06-29 PROBLEM — I10 ESSENTIAL (PRIMARY) HYPERTENSION: Chronic | Status: ACTIVE | Noted: 2017-06-21

## 2017-06-29 PROBLEM — N81.10 CYSTOCELE, UNSPECIFIED: Chronic | Status: ACTIVE | Noted: 2017-06-21

## 2017-09-08 ENCOUNTER — APPOINTMENT (OUTPATIENT)
Dept: NEUROLOGY | Facility: CLINIC | Age: 82
End: 2017-09-08
Payer: MEDICARE

## 2017-09-08 VITALS
DIASTOLIC BLOOD PRESSURE: 76 MMHG | HEIGHT: 60 IN | BODY MASS INDEX: 20.62 KG/M2 | SYSTOLIC BLOOD PRESSURE: 157 MMHG | WEIGHT: 105 LBS | HEART RATE: 84 BPM

## 2017-09-08 DIAGNOSIS — I63.9 CEREBRAL INFARCTION, UNSPECIFIED: ICD-10-CM

## 2017-09-08 DIAGNOSIS — Z86.39 PERSONAL HISTORY OF OTHER ENDOCRINE, NUTRITIONAL AND METABOLIC DISEASE: ICD-10-CM

## 2017-09-08 DIAGNOSIS — Z86.69 PERSONAL HISTORY OF OTHER DISEASES OF THE NERVOUS SYSTEM AND SENSE ORGANS: ICD-10-CM

## 2017-09-08 DIAGNOSIS — Z78.9 OTHER SPECIFIED HEALTH STATUS: ICD-10-CM

## 2017-09-08 DIAGNOSIS — Z87.39 PERSONAL HISTORY OF OTHER DISEASES OF THE MUSCULOSKELETAL SYSTEM AND CONNECTIVE TISSUE: ICD-10-CM

## 2017-09-08 DIAGNOSIS — Z85.9 PERSONAL HISTORY OF MALIGNANT NEOPLASM, UNSPECIFIED: ICD-10-CM

## 2017-09-08 PROCEDURE — 99205 OFFICE O/P NEW HI 60 MIN: CPT | Mod: GC

## 2017-09-08 RX ORDER — AMLODIPINE BESYLATE 5 MG/1
5 TABLET ORAL
Qty: 90 | Refills: 0 | Status: ACTIVE | COMMUNITY
Start: 2017-01-31

## 2017-09-08 RX ORDER — ALENDRONATE SODIUM 70 MG/1
70 TABLET ORAL
Qty: 12 | Refills: 0 | Status: ACTIVE | COMMUNITY
Start: 2017-01-31

## 2017-09-08 RX ORDER — SIMVASTATIN 10 MG/1
10 TABLET, FILM COATED ORAL
Qty: 90 | Refills: 0 | Status: ACTIVE | COMMUNITY
Start: 2017-02-12

## 2017-09-08 RX ORDER — ASPIRIN 81 MG/1
81 TABLET ORAL DAILY
Qty: 30 | Refills: 4 | Status: ACTIVE | COMMUNITY
Start: 2017-09-08 | End: 1900-01-01

## 2017-09-11 ENCOUNTER — APPOINTMENT (OUTPATIENT)
Dept: VASCULAR SURGERY | Facility: CLINIC | Age: 82
End: 2017-09-11
Payer: MEDICARE

## 2017-09-11 VITALS — SYSTOLIC BLOOD PRESSURE: 135 MMHG | HEART RATE: 75 BPM | DIASTOLIC BLOOD PRESSURE: 67 MMHG

## 2017-09-11 VITALS — TEMPERATURE: 98.6 F | RESPIRATION RATE: 16 BRPM | WEIGHT: 105 LBS | BODY MASS INDEX: 20.62 KG/M2 | HEIGHT: 60 IN

## 2017-09-11 DIAGNOSIS — Z86.69 PERSONAL HISTORY OF OTHER DISEASES OF THE NERVOUS SYSTEM AND SENSE ORGANS: ICD-10-CM

## 2017-09-11 DIAGNOSIS — I99.9 UNSPECIFIED DISORDER OF CIRCULATORY SYSTEM: ICD-10-CM

## 2017-09-11 DIAGNOSIS — I10 ESSENTIAL (PRIMARY) HYPERTENSION: ICD-10-CM

## 2017-09-11 DIAGNOSIS — I83.90 ASYMPTOMATIC VARICOSE VEINS OF UNSPECIFIED LOWER EXTREMITY: ICD-10-CM

## 2017-09-11 PROCEDURE — 99204 OFFICE O/P NEW MOD 45 MIN: CPT | Mod: 25

## 2017-10-09 ENCOUNTER — APPOINTMENT (OUTPATIENT)
Dept: NEUROLOGY | Facility: CLINIC | Age: 82
End: 2017-10-09
Payer: MEDICARE

## 2017-10-09 PROCEDURE — 95885 MUSC TST DONE W/NERV TST LIM: CPT

## 2017-10-09 PROCEDURE — 95908 NRV CNDJ TST 3-4 STUDIES: CPT

## 2018-01-08 ENCOUNTER — APPOINTMENT (OUTPATIENT)
Dept: NEUROLOGY | Facility: CLINIC | Age: 83
End: 2018-01-08
Payer: MEDICARE

## 2018-01-08 VITALS
BODY MASS INDEX: 20.62 KG/M2 | WEIGHT: 105 LBS | SYSTOLIC BLOOD PRESSURE: 129 MMHG | HEART RATE: 90 BPM | DIASTOLIC BLOOD PRESSURE: 69 MMHG | HEIGHT: 60 IN

## 2018-01-08 PROCEDURE — 99214 OFFICE O/P EST MOD 30 MIN: CPT

## 2018-01-22 ENCOUNTER — APPOINTMENT (OUTPATIENT)
Dept: NEUROLOGY | Facility: CLINIC | Age: 83
End: 2018-01-22
Payer: MEDICARE

## 2018-01-22 PROCEDURE — 93888 INTRACRANIAL LIMITED STUDY: CPT

## 2018-02-05 ENCOUNTER — APPOINTMENT (OUTPATIENT)
Dept: VASCULAR SURGERY | Facility: CLINIC | Age: 83
End: 2018-02-05
Payer: MEDICARE

## 2018-02-05 PROCEDURE — 93880 EXTRACRANIAL BILAT STUDY: CPT

## 2018-02-05 PROCEDURE — 99214 OFFICE O/P EST MOD 30 MIN: CPT

## 2018-04-24 ENCOUNTER — APPOINTMENT (OUTPATIENT)
Dept: NEUROLOGY | Facility: CLINIC | Age: 83
End: 2018-04-24
Payer: MEDICARE

## 2018-04-24 VITALS
HEART RATE: 84 BPM | DIASTOLIC BLOOD PRESSURE: 74 MMHG | WEIGHT: 110 LBS | HEIGHT: 60 IN | SYSTOLIC BLOOD PRESSURE: 167 MMHG | BODY MASS INDEX: 21.6 KG/M2

## 2018-04-24 DIAGNOSIS — R55 SYNCOPE AND COLLAPSE: ICD-10-CM

## 2018-04-24 PROCEDURE — 99213 OFFICE O/P EST LOW 20 MIN: CPT

## 2018-08-20 NOTE — DISCHARGE NOTE ADULT - PROVIDER TOKENS
POD#1 s/p Repair of Left Quad tendon    Lab: H at 6.6 this AM  Transfusion in progress    Dressing clean and dry  Hemavac with moderate drainage  Some fullness in thigh, non tender  Calf's non tender    Imp: POD#1 s/p LEft Quad repair  Acute anemia with he TOKEN:'[id number]',TOKEN:'[id number]' TOKEN:'[id number]'

## 2018-10-01 ENCOUNTER — APPOINTMENT (OUTPATIENT)
Dept: VASCULAR SURGERY | Facility: CLINIC | Age: 83
End: 2018-10-01

## 2019-02-10 NOTE — DISCHARGE NOTE ADULT - PATIENT PORTAL LINK FT
“You can access the Follow[facility] Patient Portal, offered by [facility], by registering with the following website: [email or web address]” 6

## 2019-04-22 ENCOUNTER — APPOINTMENT (OUTPATIENT)
Dept: NEUROLOGY | Facility: CLINIC | Age: 84
End: 2019-04-22

## 2022-01-25 ENCOUNTER — APPOINTMENT (OUTPATIENT)
Dept: NEUROLOGY | Facility: CLINIC | Age: 87
End: 2022-01-25
Payer: MEDICARE

## 2022-01-25 VITALS
OXYGEN SATURATION: 98 % | TEMPERATURE: 97.5 F | DIASTOLIC BLOOD PRESSURE: 71 MMHG | HEART RATE: 100 BPM | SYSTOLIC BLOOD PRESSURE: 171 MMHG | WEIGHT: 100 LBS | BODY MASS INDEX: 19.63 KG/M2 | HEIGHT: 60 IN

## 2022-01-25 DIAGNOSIS — S06.5X9A TRAUMATIC SUBDURAL HEMORRHAGE WITH LOSS OF CONSCIOUSNESS OF UNSPECIFIED DURATION, INITIAL ENCOUNTER: ICD-10-CM
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
## 2022-01-25 DIAGNOSIS — R41.3 OTHER AMNESIA: ICD-10-CM

## 2022-01-25 DIAGNOSIS — R40.4 TRANSIENT ALTERATION OF AWARENESS: ICD-10-CM

## 2022-01-25 PROCEDURE — 99205 OFFICE O/P NEW HI 60 MIN: CPT

## 2022-01-25 RX ORDER — LIDOCAINE 5 G/100G
5 OINTMENT TOPICAL
Qty: 35 | Refills: 0 | Status: COMPLETED | COMMUNITY
Start: 2017-06-25 | End: 2022-01-25

## 2022-01-25 RX ORDER — PRAVASTATIN SODIUM 20 MG/1
20 TABLET ORAL
Qty: 90 | Refills: 0 | Status: ACTIVE | COMMUNITY
Start: 2022-01-06

## 2022-01-25 RX ORDER — RAMIPRIL 5 MG/1
5 CAPSULE ORAL
Qty: 90 | Refills: 0 | Status: COMPLETED | COMMUNITY
Start: 2017-01-31 | End: 2022-01-25

## 2022-01-25 RX ORDER — LISINOPRIL 20 MG/1
20 TABLET ORAL
Qty: 90 | Refills: 0 | Status: ACTIVE | COMMUNITY
Start: 2022-01-06

## 2022-01-26 NOTE — ADDENDUM
[FreeTextEntry1] : Spoke to son in regards to Admit to [facility] to r/o seizures but has declined. will complete EEG. also noted after appt pt was back to her baseline and fully conversant with PCP with no confusion

## 2022-01-26 NOTE — PHYSICAL EXAM
[Person] : oriented to person [Place] : oriented to place [Time] : oriented to time [Date / Time ___ / 5] : date / time [unfilled] / 5 [Place ___ / 5] : place [unfilled] / 5 [Registration ___ / 3] : registration [unfilled] / 3 [Naming 2 Objects ___ / 2] : naming two objects [unfilled] / 2 [Repeating a Sentence ___ / 1] : repeating a sentence [unfilled] / 1 [3-stage Verbal Command ___ / 3] : three-stage verbal command [unfilled] / 3 [Written Command ___ / 1] : written command [unfilled] / 1 [Recall ___ / 3] : recall [unfilled] / 3 [Motor Handedness Left-Handed] : the patient is left hand dominant [Writing A Sentence] : no difficulty writing a sentence [Cranial Nerves Optic (II)] : visual acuity intact bilaterally,  visual fields full to confrontation, pupils equal round and reactive to light [Cranial Nerves Oculomotor (III)] : extraocular motion intact [Cranial Nerves Trigeminal (V)] : facial sensation intact symmetrically [Cranial Nerves Facial (VII)] : face symmetrical [Cranial Nerves Glossopharyngeal (IX)] : tongue and palate midline [Cranial Nerves Accessory (XI - Cranial And Spinal)] : head turning and shoulder shrug symmetric [Cranial Nerves Hypoglossal (XII)] : there was no tongue deviation with protrusion [PERRL With Normal Accommodation] : pupils were equal in size, round, reactive to light, with normal accommodation [Extraocular Movements] : extraocular movements were intact [] : no respiratory distress [Edema] : there was no peripheral edema [Short Term Intact] : short term memory impaired [Concentration Intact] : a decrease in concentrating ability was observed [FreeTextEntry8] : unsteadygait [FreeTextEntry9] : DTR symmetric

## 2022-01-26 NOTE — HISTORY OF PRESENT ILLNESS
[FreeTextEntry1] : 89 olf LH woman w/ PMH HTN presenting following hospitalization for  fall. here today for change in behavior.\par Son reports on 1.3.22  she slipped  and felll down 4 steps. was found to alert, but not aware of her surroundings or incident. EMS was called 02 sat was noted to be 81, son reports no known symptoms prior. was taken to [facility] (no records available) admitted for SDH and covid.  Did scans and told no broken bones and bleed was stable.  treated with Bamlanivimab  discharged on 1.6.22.\par son reports she has not been back to herself. She has been more anxious and forgetful. she has moments where she can  be more with it.  Does different hand  movements and has noticed a hand tremor that can occur intermittently.Now she requires full assistance with her ADLs. she  can feed her self. forgets what she  is not incontinent  \par she his able to walk, but son gives walker \par states she constantly fixes her clothes  and does a lot

## 2022-01-26 NOTE — HISTORY OF PRESENT ILLNESS
[FreeTextEntry1] : 89 olf LH woman w/ PMH HTN presenting following hospitalization for  fall. here today for change in behavior.\par Son reports on 1.3.22  she slipped  and felll down 4 steps. was found to alert, but not aware of her surroundings or incident. EMS was called 02 sat was noted to be 81, son reports no known symptoms prior. was taken to [facility] (no records available) admitted for SDH and covid.  Did scans and told no broken bones and bleed was stable.  treated with Bamlanivimab  discharged on 1.6.22.\par son reports she has not been back to herself. She has been more anxious and forgetful. she has moments where she can  be more with it.  Does different hand  movements and has noticed a hand tremor that can occur intermittently.Now she requires full assistance with her ADLs. she  can feed her self. forgets what she  is not incontinent  \par she his able to walk, but son gives walker \par states she constantly fixes her clothes  and does a lot

## 2022-01-26 NOTE — ASSESSMENT
[FreeTextEntry1] : 89 yr old LH woman w/ PMH HTN, HLD, presenting following hospitalization s/p slip and fall down 4 steps found to be hypoxic, diagnosed with covid infection and  SDH. likely cause of her encephalopathy.  with her age, recent  infection and SDH, she is at risk for seizures especially that she is no longer on keppra. with her cognitive changes, zoning out episodes, intermittent hand tremors, will further evaluate with repeat Head CT- cannot do MRI-has metal in body rEEG/ aEEG r/o epileptiform changes\par son advised to obtain  hospital records\par \par \par \par

## 2022-02-02 ENCOUNTER — OUTPATIENT (OUTPATIENT)
Dept: OUTPATIENT SERVICES | Facility: HOSPITAL | Age: 87
LOS: 1 days | End: 2022-02-02
Payer: COMMERCIAL

## 2022-02-02 ENCOUNTER — APPOINTMENT (OUTPATIENT)
Dept: CT IMAGING | Facility: IMAGING CENTER | Age: 87
End: 2022-02-02
Payer: MEDICARE

## 2022-02-02 DIAGNOSIS — S06.5X9A TRAUMATIC SUBDURAL HEMORRHAGE WITH LOSS OF CONSCIOUSNESS OF UNSPECIFIED DURATION, INITIAL ENCOUNTER: ICD-10-CM

## 2022-02-02 DIAGNOSIS — Z98.51 TUBAL LIGATION STATUS: Chronic | ICD-10-CM

## 2022-02-02 PROCEDURE — 70450 CT HEAD/BRAIN W/O DYE: CPT

## 2022-02-02 PROCEDURE — 70450 CT HEAD/BRAIN W/O DYE: CPT | Mod: 26

## 2022-02-07 ENCOUNTER — NON-APPOINTMENT (OUTPATIENT)
Age: 87
End: 2022-02-07

## 2022-02-11 ENCOUNTER — APPOINTMENT (OUTPATIENT)
Dept: NEUROLOGY | Facility: CLINIC | Age: 87
End: 2022-02-11
Payer: MEDICARE

## 2022-02-11 PROCEDURE — 95816 EEG AWAKE AND DROWSY: CPT

## 2022-02-12 PROCEDURE — 95719 EEG PHYS/QHP EA INCR W/O VID: CPT

## 2022-02-12 PROCEDURE — 95708 EEG WO VID EA 12-26HR UNMNTR: CPT

## 2022-02-12 PROCEDURE — 95700 EEG CONT REC W/VID EEG TECH: CPT

## 2022-02-17 ENCOUNTER — NON-APPOINTMENT (OUTPATIENT)
Age: 87
End: 2022-02-17

## 2022-02-18 ENCOUNTER — INPATIENT (INPATIENT)
Facility: HOSPITAL | Age: 87
LOS: 4 days | Discharge: HOME HEALTH | DRG: 689 | End: 2022-02-23
Attending: INTERNAL MEDICINE | Admitting: INTERNAL MEDICINE
Payer: COMMERCIAL

## 2022-02-18 VITALS
RESPIRATION RATE: 18 BRPM | HEIGHT: 59 IN | HEART RATE: 88 BPM | DIASTOLIC BLOOD PRESSURE: 73 MMHG | TEMPERATURE: 97 F | SYSTOLIC BLOOD PRESSURE: 151 MMHG | WEIGHT: 100.09 LBS | OXYGEN SATURATION: 95 %

## 2022-02-18 DIAGNOSIS — N39.0 URINARY TRACT INFECTION, SITE NOT SPECIFIED: ICD-10-CM

## 2022-02-18 DIAGNOSIS — E78.5 HYPERLIPIDEMIA, UNSPECIFIED: ICD-10-CM

## 2022-02-18 DIAGNOSIS — R25.8 OTHER ABNORMAL INVOLUNTARY MOVEMENTS: ICD-10-CM

## 2022-02-18 DIAGNOSIS — I65.21 OCCLUSION AND STENOSIS OF RIGHT CAROTID ARTERY: ICD-10-CM

## 2022-02-18 DIAGNOSIS — I10 ESSENTIAL (PRIMARY) HYPERTENSION: ICD-10-CM

## 2022-02-18 DIAGNOSIS — Z98.51 TUBAL LIGATION STATUS: Chronic | ICD-10-CM

## 2022-02-18 DIAGNOSIS — R29.6 REPEATED FALLS: ICD-10-CM

## 2022-02-18 LAB
ALBUMIN SERPL ELPH-MCNC: 4 G/DL — SIGNIFICANT CHANGE UP (ref 3.3–5)
ALP SERPL-CCNC: 151 U/L — HIGH (ref 40–120)
ALT FLD-CCNC: 41 U/L — SIGNIFICANT CHANGE UP (ref 10–45)
ANION GAP SERPL CALC-SCNC: 13 MMOL/L — SIGNIFICANT CHANGE UP (ref 5–17)
APPEARANCE UR: CLEAR — SIGNIFICANT CHANGE UP
AST SERPL-CCNC: 33 U/L — SIGNIFICANT CHANGE UP (ref 10–40)
BACTERIA # UR AUTO: ABNORMAL
BASOPHILS # BLD AUTO: 0.06 K/UL — SIGNIFICANT CHANGE UP (ref 0–0.2)
BASOPHILS NFR BLD AUTO: 0.5 % — SIGNIFICANT CHANGE UP (ref 0–2)
BILIRUB SERPL-MCNC: 0.2 MG/DL — SIGNIFICANT CHANGE UP (ref 0.2–1.2)
BILIRUB UR-MCNC: NEGATIVE — SIGNIFICANT CHANGE UP
BUN SERPL-MCNC: 26 MG/DL — HIGH (ref 7–23)
CALCIUM SERPL-MCNC: 10 MG/DL — SIGNIFICANT CHANGE UP (ref 8.4–10.5)
CHLORIDE SERPL-SCNC: 103 MMOL/L — SIGNIFICANT CHANGE UP (ref 96–108)
CO2 SERPL-SCNC: 24 MMOL/L — SIGNIFICANT CHANGE UP (ref 22–31)
COLOR SPEC: SIGNIFICANT CHANGE UP
CREAT SERPL-MCNC: 0.73 MG/DL — SIGNIFICANT CHANGE UP (ref 0.5–1.3)
DIFF PNL FLD: NEGATIVE — SIGNIFICANT CHANGE UP
EOSINOPHIL # BLD AUTO: 0.05 K/UL — SIGNIFICANT CHANGE UP (ref 0–0.5)
EOSINOPHIL NFR BLD AUTO: 0.4 % — SIGNIFICANT CHANGE UP (ref 0–6)
EPI CELLS # UR: 1 /HPF — SIGNIFICANT CHANGE UP
GLUCOSE SERPL-MCNC: 162 MG/DL — HIGH (ref 70–99)
GLUCOSE UR QL: NEGATIVE — SIGNIFICANT CHANGE UP
HCT VFR BLD CALC: 41.3 % — SIGNIFICANT CHANGE UP (ref 34.5–45)
HGB BLD-MCNC: 12.7 G/DL — SIGNIFICANT CHANGE UP (ref 11.5–15.5)
HYALINE CASTS # UR AUTO: 0 /LPF — SIGNIFICANT CHANGE UP (ref 0–2)
IMM GRANULOCYTES NFR BLD AUTO: 0.8 % — SIGNIFICANT CHANGE UP (ref 0–1.5)
KETONES UR-MCNC: NEGATIVE — SIGNIFICANT CHANGE UP
LEUKOCYTE ESTERASE UR-ACNC: ABNORMAL
LYMPHOCYTES # BLD AUTO: 19.6 % — SIGNIFICANT CHANGE UP (ref 13–44)
LYMPHOCYTES # BLD AUTO: 2.19 K/UL — SIGNIFICANT CHANGE UP (ref 1–3.3)
MAGNESIUM SERPL-MCNC: 2.3 MG/DL — SIGNIFICANT CHANGE UP (ref 1.6–2.6)
MCHC RBC-ENTMCNC: 28.4 PG — SIGNIFICANT CHANGE UP (ref 27–34)
MCHC RBC-ENTMCNC: 30.8 GM/DL — LOW (ref 32–36)
MCV RBC AUTO: 92.4 FL — SIGNIFICANT CHANGE UP (ref 80–100)
MONOCYTES # BLD AUTO: 0.89 K/UL — SIGNIFICANT CHANGE UP (ref 0–0.9)
MONOCYTES NFR BLD AUTO: 7.9 % — SIGNIFICANT CHANGE UP (ref 2–14)
NEUTROPHILS # BLD AUTO: 7.92 K/UL — HIGH (ref 1.8–7.4)
NEUTROPHILS NFR BLD AUTO: 70.8 % — SIGNIFICANT CHANGE UP (ref 43–77)
NITRITE UR-MCNC: POSITIVE
NRBC # BLD: 0 /100 WBCS — SIGNIFICANT CHANGE UP (ref 0–0)
PH UR: 6.5 — SIGNIFICANT CHANGE UP (ref 5–8)
PHOSPHATE SERPL-MCNC: 4 MG/DL — SIGNIFICANT CHANGE UP (ref 2.5–4.5)
PLATELET # BLD AUTO: 370 K/UL — SIGNIFICANT CHANGE UP (ref 150–400)
POTASSIUM SERPL-MCNC: 4.9 MMOL/L — SIGNIFICANT CHANGE UP (ref 3.5–5.3)
POTASSIUM SERPL-SCNC: 4.9 MMOL/L — SIGNIFICANT CHANGE UP (ref 3.5–5.3)
PROT SERPL-MCNC: 6.8 G/DL — SIGNIFICANT CHANGE UP (ref 6–8.3)
PROT UR-MCNC: SIGNIFICANT CHANGE UP
RBC # BLD: 4.47 M/UL — SIGNIFICANT CHANGE UP (ref 3.8–5.2)
RBC # FLD: 18.1 % — HIGH (ref 10.3–14.5)
RBC CASTS # UR COMP ASSIST: 1 /HPF — SIGNIFICANT CHANGE UP (ref 0–4)
SARS-COV-2 RNA SPEC QL NAA+PROBE: SIGNIFICANT CHANGE UP
SODIUM SERPL-SCNC: 140 MMOL/L — SIGNIFICANT CHANGE UP (ref 135–145)
SP GR SPEC: 1.03 — HIGH (ref 1.01–1.02)
TSH SERPL-MCNC: 2.35 UIU/ML — SIGNIFICANT CHANGE UP (ref 0.27–4.2)
UROBILINOGEN FLD QL: NEGATIVE — SIGNIFICANT CHANGE UP
WBC # BLD: 11.2 K/UL — HIGH (ref 3.8–10.5)
WBC # FLD AUTO: 11.2 K/UL — HIGH (ref 3.8–10.5)
WBC UR QL: 99 /HPF — HIGH (ref 0–5)

## 2022-02-18 PROCEDURE — G1004: CPT

## 2022-02-18 PROCEDURE — 70498 CT ANGIOGRAPHY NECK: CPT | Mod: 26,MG

## 2022-02-18 PROCEDURE — 70496 CT ANGIOGRAPHY HEAD: CPT | Mod: 26,MG

## 2022-02-18 PROCEDURE — 99285 EMERGENCY DEPT VISIT HI MDM: CPT

## 2022-02-18 PROCEDURE — 99223 1ST HOSP IP/OBS HIGH 75: CPT | Mod: GC

## 2022-02-18 PROCEDURE — 93010 ELECTROCARDIOGRAM REPORT: CPT

## 2022-02-18 PROCEDURE — 71045 X-RAY EXAM CHEST 1 VIEW: CPT | Mod: 26

## 2022-02-18 RX ORDER — LISINOPRIL 2.5 MG/1
20 TABLET ORAL DAILY
Refills: 0 | Status: DISCONTINUED | OUTPATIENT
Start: 2022-02-18 | End: 2022-02-23

## 2022-02-18 RX ORDER — SIMVASTATIN 20 MG/1
10 TABLET, FILM COATED ORAL AT BEDTIME
Refills: 0 | Status: DISCONTINUED | OUTPATIENT
Start: 2022-02-18 | End: 2022-02-23

## 2022-02-18 RX ORDER — CEFTRIAXONE 500 MG/1
1000 INJECTION, POWDER, FOR SOLUTION INTRAMUSCULAR; INTRAVENOUS ONCE
Refills: 0 | Status: COMPLETED | OUTPATIENT
Start: 2022-02-18 | End: 2022-02-18

## 2022-02-18 RX ORDER — AMLODIPINE BESYLATE 2.5 MG/1
5 TABLET ORAL DAILY
Refills: 0 | Status: DISCONTINUED | OUTPATIENT
Start: 2022-02-18 | End: 2022-02-23

## 2022-02-18 RX ADMIN — SIMVASTATIN 10 MILLIGRAM(S): 20 TABLET, FILM COATED ORAL at 21:58

## 2022-02-18 RX ADMIN — CEFTRIAXONE 100 MILLIGRAM(S): 500 INJECTION, POWDER, FOR SOLUTION INTRAMUSCULAR; INTRAVENOUS at 15:13

## 2022-02-18 NOTE — H&P ADULT - PROBLEM SELECTOR PLAN 1
Neuro eval appreciated, EEG ambulatory no seizure activity   MRI Patient hs a metal in the body as per family  Follow up Vit b12,TSH and RPR   Psych consult

## 2022-02-18 NOTE — CONSULT NOTE ADULT - ASSESSMENT
89 y/o F presenting with 6 weeks of repetitive peculiar movement behaviors following a recent hospitalization for a fall at home and 2-3 mm subdural hemorrhage. Given her presentation, highest on the differential is a psychiatric illness. Seizures were considered as an etiology of the movement behaviors however ambulatory EEG showed no epileptiform changes.       Plan:  -MRI brain non con to r/o beginnings of an encephalitis   -Send out B12, RPR, B1, Vitamin D  -If MRI negative, consider psychiatric consult    89 y/o F presenting with acute worsening of episodes of altered mentation with repetitive behaviors following a recent hospitalization for a fall at home and 2-3 mm subdural hemorrhage and COVID pneumonia. Given her presentation, highest on the differential is a psychiatric illness. Seizures were considered as an etiology of events, however ambulatory EEG showed no epileptiform changes and had captured the events of concerns, so unlikely to be seizures.       Recommendations:   -MRI brain non con to r/o beginnings of an encephalitis --- if patient is able to (history of metal in body)  -Send out B12, RPR, B1, Vitamin D  -Psych consult

## 2022-02-18 NOTE — ED PROVIDER NOTE - CLINICAL SUMMARY MEDICAL DECISION MAKING FREE TEXT BOX
MILY Chapman MD: Pt is a 89 y/o female with PMH HTN, CVA, SDH 2/2 trauma Jan 2022 who is BIB son for episodes of altered responsiveness.  Pt reports she has had increased frequency and duration of these episodes, including a 5-hr episode yesterday, where she is unable to speak and becomes lethargic. Pt is aware of the episodes. Pt had an EEG earlier this week, unknown results. Son spoke with NP from Neurology practice who advised that pt come to ED. Pt is not on AEDs/blood thinners. No trauma, f/c, urinary sx, AP, CP, SOB, n/v/d. Plan: basic labs, u/a, ucx, brain imaging, neurology consult

## 2022-02-18 NOTE — ED ADULT NURSE NOTE - OBJECTIVE STATEMENT
Female 89 years awake, alert and orienmtedx2, s/p fall Malcolm 3, was in [facility] for 3 days, diagnosed with subdural hematoma was on Keppra for few days, came in today for episode of "mode", son states, she groan, moans and can't speak which lasts for minutes and last night for 5 hours. Son states patient knows when she's on that "mode". Denies headache, dizziness, chest pain or sob. Son reports pt incontinent sometimes. Denies burning in urination. Comfort/safety maintained. Call bell within reach. Bed locked in lowest position.

## 2022-02-18 NOTE — ED ADULT TRIAGE NOTE - AS HEIGHT TYPE
Patient edema has improved. Patient wrapped again in 2 layer coflex. Next appt. Is Thursday and patient is to bring compression stockings. Will evaluate at that time if patient can transition into stockings. actual/stated

## 2022-02-18 NOTE — CONSULT NOTE ADULT - ATTENDING COMMENTS
HPI as per resident note, personally verified by me. Episodes reported since head trauma < 1 week ago, increasing in duration from 30 minutes to 5 hours. Patient maintains awareness all throughout but has difficult speaking through them other than in ritualistic phrases. Some stereotypy noted. Captured on ambulatory EEG and no evidence of electrographic seizure activity during them    - Mental Status: Alert, awake, oriented to person, place, and time; speech is fluent with intact naming, repetition, and follows 1-step and 3-step mid-line crossing commands; good overall fund of knowledge (aware of current events, relevant past history, and vocabulary appropriate for level of education); recent and remote memor intact with 3/3 words, attn/conc WNL    - Cranial Nerves:  II: Visual fields are full to confrontation; pupils are equal, round, and reactive to light   III, IV, VI: Extraocular movements are intact with left horizontal end gaze fatigable nystagmus  V: Facial sensation is intact in the V1-V3 distribution bilaterally  VII: Face is symmetric with normal eye closure and smile  VIII: Hearing is dec to finger rub b/l (baseline)  IX, X: Uvula is midline and soft palate rises symmetrically  XI: Shoulder shrug intact  XII: Tongue protrudes in the midline    - Motor/Strength Testing:                                 Right           Left  Deltoid                     5                 5  Biceps                      5                 5  Triceps                     5                 5  Wrist Ext (radial)       5                 5  Hand                  5                 5    Hip Flex                   5                  5  Knee Ext	      5                  5  Dorsiflex                  5                  5  Plantarflex               5                  5    - There is no pronator drift.   - Normal muscle bulk and tone throughout.    - Reflexes:   Bicep (C5/C6):                  R 2+ and brisk --- L 3+   Brachioradialis (C5/C6) :   R 2+ --- L 2+   Patella (L3/L4) :                 R 3+ --- L 3+   Ankle (S1) :                       R 2+ --- L 2+     - Plantar responses downgoing bilaterally.    - Sensory: Intact throughout to light touch.   - Coordination: Finger-nose-finger intact without ataxia or dysmetria.    - Gait: Slow gait but normal steps, base, arm swing, and turning. Romberg (-)    A/P:  Other amnesia  Abnormal movements  Subdural hematoma (SDH)  Falls  UTI  HTN    - Given ritualistic nature of events and no evidence of seizure activity on EEG would strongly favor psychiatric origin versus other toxic/metabolic process. However, cannot completely exclude early stages of cerebral inflammatory process (autoimmune/paraneoplastic?) contributing to all but much less likely  - MRI brain w/o to assess for above (if clear with metal in body)  - Check labs for additional causes with B12, Vit D, B1, RPR  - Would consult psychiatry if head imaging unrevealing  - Continue to address above medical problems, as you are doing  - Will continue to follow patient with you pending results of above studies

## 2022-02-18 NOTE — ED PROVIDER NOTE - PHYSICAL EXAMINATION
Gen: AAO x 3, NAD  Skin: No rashes or lesions  HEENT: NC/AT, PERRLA, EOMI, MMM  Resp: unlabored CTAB  Cardiac: rrr s1s2, no murmurs, rubs or gallops  GI: ND, +BS, Soft, NT  Ext: no pedal edema, FROM in all extremities  Neuro: strength 5/5 BUE and BLE, sensation intact, clear speech, no facial asymmetry,

## 2022-02-18 NOTE — PATIENT PROFILE ADULT - IS THERE A SUSPICION OF ABUSE/NEGLIGENCE?
Diovan 160/12.5 for 7 days authorized and Diovan 320/25 mg for second week authorized per drs orders.
[facility]  [phone]  Dr has sent over a different medicine  Was:  Valsarton-hydorchorothizide    Now sending over just Valsarton-  Need to clarify strength, amount and directions-ASAP  Pt is waiting at Pharm.
no

## 2022-02-18 NOTE — ED PROVIDER NOTE - PROGRESS NOTE DETAILS
Patient seen at bedside in NAD.  VSS.  Patient resting comfortably without complaints. Labs significant for nitrite positive urine.  Started on ceftriaxone. CT/CTA head and neck negative for acute pathology.  ICA stenosis and small aneurysm noted.  Patient and family made aware and educated on need for outpatient follow up. Neuro recommending MRI.  WIll admit to medicine.  -Pete Peralta PA-C

## 2022-02-18 NOTE — ED PROVIDER NOTE - OBJECTIVE STATEMENT
89 yo female with PMHx of HTN, CVA p/w AMS.  Patient's son at bedside provides most of the hx.  He reports patient had a fall on Jan 2nd of this year.  Patient was taken to [facility] where she was found to have a traumatic SDH.  Patient was discharged a few days later on a short course of keppra which she finished.  After discharge patient was recuperating at home and slowly getting better.  4-5 days ago patient had an episode of unresponsiveness.  Patient became very lethargic with head bobbing and was unable to speak.  The episode lasted ~1hr then resolved.  Since then she has had several similar episodes ranging from 30min to 5hrs.  Patient followed up with her neurologist and had an at home EEG, but no results available.  Contacted neurology yesterday who instructed patient to come to the ER.  Patient current at her baseline.  Denies fevers/chills, CP, SOB, abd pain, dysuria, nausea/vomiting, diarrhea, weakness, numbness, slurred speech.

## 2022-02-18 NOTE — H&P ADULT - HISTORY OF PRESENT ILLNESS
89 yo female with pmhx  HTN, CVA p/w AMS.  Patient's son reports patient had a fall on Jan 2nd of this year.  Patient was taken to [facility] where she was found to have a traumatic SDH.  Patient was discharged a few days later on a short course of keppra which she finished.  After discharge patient was recuperating at home and slowly getting better.  4-5 days ago patient had an episode of unresponsiveness.  Patient became very lethargic with head bobbing and was unable to speak.  The episode lasted ~1hr then resolved.  Since then she has had several similar episodes ranging from 30min to 5hrs.  Patient followed up with her neurologist and had an at home EEG, but no results available.      Currently patient AAO x3 states that she has problems with side vision cannot see the objects at times when she turns head side wards.   In the ed patient had CTA Brain Neck with ICA stenosis and no other acute findings

## 2022-02-18 NOTE — ED ADULT TRIAGE NOTE - CHIEF COMPLAINT QUOTE
had a subdural after fall in jan; has recovered but now goes into a "mode"; family states moans, doesn't speak, closes eyes; lasts from minutes to hours

## 2022-02-18 NOTE — PATIENT PROFILE ADULT - FALL HARM RISK - HARM RISK INTERVENTIONS
- continue home dose, LT4 100mcg daily  - Last TSH was 4 mo and was WNL     Assistance with ambulation/Assistance OOB with selected safe patient handling equipment/Communicate Risk of Fall with Harm to all staff/Discuss with provider need for PT consult/Monitor gait and stability/Provide patient with walking aids - walker, cane, crutches/Reinforce activity limits and safety measures with patient and family/Tailored Fall Risk Interventions/Visual Cue: Yellow wristband and red socks/Bed in lowest position, wheels locked, appropriate side rails in place/Call bell, personal items and telephone in reach/Instruct patient to call for assistance before getting out of bed or chair/Non-slip footwear when patient is out of bed/[place] to call system/Physically safe environment - no spills, clutter or unnecessary equipment/Purposeful Proactive Rounding/Room/bathroom lighting operational, light cord in reach

## 2022-02-19 PROCEDURE — 99222 1ST HOSP IP/OBS MODERATE 55: CPT

## 2022-02-19 RX ADMIN — AMLODIPINE BESYLATE 5 MILLIGRAM(S): 2.5 TABLET ORAL at 06:07

## 2022-02-19 RX ADMIN — LISINOPRIL 20 MILLIGRAM(S): 2.5 TABLET ORAL at 06:07

## 2022-02-19 RX ADMIN — SIMVASTATIN 10 MILLIGRAM(S): 20 TABLET, FILM COATED ORAL at 21:11

## 2022-02-19 NOTE — BH CONSULTATION LIAISON ASSESSMENT NOTE - HPI (INCLUDE ILLNESS QUALITY, SEVERITY, DURATION, TIMING, CONTEXT, MODIFYING FACTORS, ASSOCIATED SIGNS AND SYMPTOMS)
Pt is a 89 yo female, domiciled with sons, retired, no known PPH, with pmhx HTN, CVA, and traumatic SDH who p/w AMS, psych consulted for AMS and repetitive movements.     Pt seen in bed. Pt. reports having a fall in early January with subsequent seizure activities and hospitalizations. Says that she has been doing better now. Lives with her two sons and at baseline helps with cleaning, cooking, and feels safe. Denies all repetitive movements. Denies all past psychiatric history. Says that her mood has been "good," denying all depressed symptoms, passive death wish or S/I/I/P, or previous suicide attempts. Appetite and sleep are also okay. Denies any past psychiatric history including sj and schizophrenia, never needing psychiatric treatment. Is alert and oriented to person, time, and that she is in the hospital (although does not know which hospital she is in). Denies all substance use.

## 2022-02-19 NOTE — BH CONSULTATION LIAISON ASSESSMENT NOTE - SUMMARY
Pt is a 89 yo female, domiciled with sons, retired, no known PPH, with pmhx HTN, CVA, and traumatic SDH who p/w AMS, psych consulted for AMS and repetitive movements. Pt. reports having a fall in early January with subsequent seizure activities and hospitalizations. Says that she has been doing better now. Lives with her two sons and at baseline helps with cleaning, cooking, and feels safe. Denies all repetitive movements. Denies all past psychiatric history. Says that her mood has been "good," denying all depressed symptoms, passive death wish or S/I/I/P, or previous suicide attempts. Appetite and sleep are also okay. Denies any past psychiatric history including js and schizophrenia, never needing psychiatric treatment. Is alert and oriented to person, time, and that she is in the hospital (although does not know which hospital she is in). Denies all substance use. Pt's presentation at this moment likely due to medical condition and possibly confounded by adjustment disorder d/t a medical condition

## 2022-02-19 NOTE — BH CONSULTATION LIAISON ASSESSMENT NOTE - NSBHCONSULTRECOMMENDOTHER_PSY_A_CORE FT
- Currently no evidence of any psychiatric illness  - Consider neuro consult to determine if need to restart Keppra

## 2022-02-19 NOTE — CONSULT NOTE ADULT - ASSESSMENT
ASSESSMENT: Patient is a 89F w pmh HTN, recent fall, p/w AMS after being found unresponsive. Vascular surgery consulted to evaluate right ICA stenosis.    Recommendations:  - Patient appears asymptomatic regarding ICA stenosis, no signs/symptoms of stroke   - Also noted to have some level of short segment subclavian stenosis, no definite syncope to correlate with subclavian stenosis  -    - Patient seen/examined or Plan Discussed with Fellow,    - Plan to be discussed with Attending,   ASSESSMENT: Patient is a 89F w pmh HTN, recent fall, p/w AMS after being found unresponsive. Vascular surgery consulted to evaluate right ICA stenosis.    Recommendations:  - Patient appears asymptomatic regarding ICA stenosis, no signs/symptoms of stroke   - Also noted to have some level of short segment subclavian stenosis, no definite syncope to correlate with subclavian stenosis  - Outpatient follow up with Dr. Mckee    Plan discussed with Dr. Mckee.    Soto Yang  Vascular Surgery PGY-2  [id number]

## 2022-02-19 NOTE — BH CONSULTATION LIAISON ASSESSMENT NOTE - RISK ASSESSMENT
Risk factors: acute medical condition    Protective factors: no current SIIP/HIIP, no h/o SA/SIB, no h/o psych admissions, no access to weapons, no active substance abuse, no psychosis, children, domiciled, social supports

## 2022-02-19 NOTE — BH CONSULTATION LIAISON ASSESSMENT NOTE - NSBHCHARTREVIEWVS_PSY_A_CORE FT
Vital Signs Last 24 Hrs  T(C): 37.3 (19 Feb 2022 10:27), Max: 37.3 (19 Feb 2022 10:27)  T(F): 99.1 (19 Feb 2022 10:27), Max: 99.1 (19 Feb 2022 10:27)  HR: 86 (19 Feb 2022 10:27) (68 - 86)  BP: 123/65 (19 Feb 2022 10:27) (116/73 - 147/64)  BP(mean): 91 (18 Feb 2022 18:08) (91 - 91)  RR: 18 (19 Feb 2022 10:27) (17 - 18)  SpO2: 93% (19 Feb 2022 05:38) (93% - 97%)

## 2022-02-19 NOTE — CONSULT NOTE ADULT - SUBJECTIVE AND OBJECTIVE BOX
VASCULAR SURGERY CONSULT NOTE  --------------------------------------------------------------------------------------------  [id number]    Patient is a 89y old  Female who presents with a chief complaint of altered mental status    HPI:   89 yo female with pmhx  HTN, p/w AMS.  Patient's son reports patient had a fall on  of this year.  Patient was taken to [facility] where she was found to have a traumatic SDH.  Patient was discharged a few days later on a short course of keppra which she finished.  After discharge patient was recuperating at home and slowly getting better.  4-5 days ago patient had an episode of unresponsiveness.  Patient became very lethargic with head bobbing and was unable to speak.  The episode lasted ~1hr then resolved.  Since then she has had several similar episodes ranging from 30min to 5hrs.  Patient followed up with her neurologist and had an at home EEG, but no results available.      Vascular surgery consulted to evaluate right ICA stenosis.    ROS: 10-system review is otherwise negative except HPI above.      PAST MEDICAL & SURGICAL HISTORY:  High cholesterol    HTN (hypertension)    Cystocele    Osteoporosis    H/O tubal ligation      FAMILY HISTORY:  No pertinent family history in first degree relatives      [] Family history not pertinent as reviewed with the patient and family    SOCIAL HISTORY:    Denies tobacco use    ALLERGIES: No Known Allergies      HOME MEDICATIONS:   Home Medications:  acetaminophen 325 mg oral tablet: 3 tab(s) orally every 6 hours (2017 15:48)  alendronate 70 mg oral tablet: 1 tab(s) orally once a week (2017 02:03)  Norvasc 5 mg oral tablet: 1 tab(s) orally once a day (2017 02:03)  ramipril 5 mg oral capsule: 1 cap(s) orally once a day (2017 02:03)  simvastatin 10 mg oral tablet: 1 tab(s) orally once a day (at bedtime) (2017 02:03)      CURRENT MEDICATIONS  MEDICATIONS (STANDING): amLODIPine   Tablet 5 milliGRAM(s) Oral daily  lisinopril 20 milliGRAM(s) Oral daily  simvastatin 10 milliGRAM(s) Oral at bedtime    MEDICATIONS (PRN):  --------------------------------------------------------------------------------------------    Vitals:   T(C): 36.9 (22 @ 19:48), Max: 37.3 (22 @ 10:27)  HR: 71 (22 @ 19:48) (68 - 86)  BP: 135/70 (22 @ 19:48) (116/73 - 135/70)  RR: 18 (22 @ 19:48) (18 - 18)  SpO2: 93% (22 @ 19:48) (93% - 93%)  CAPILLARY BLOOD GLUCOSE        CAPILLARY BLOOD GLUCOSE           @ 07:01  -   @ 07:00  --------------------------------------------------------  IN:    Oral Fluid: 120 mL  Total IN: 120 mL    OUT:  Total OUT: 0 mL    Total NET: 120 mL       @ 07:01  -   @ 20:56  --------------------------------------------------------  IN:    Oral Fluid: 480 mL  Total IN: 480 mL    OUT:    Stool (mL): 0 mL    Voided (mL): 1 mL  Total OUT: 1 mL    Total NET: 479 mL        Height (cm): 152.4 ( 19:15)  Weight (kg): 42.819 ( 19:15)  BMI (kg/m2): 18.4 (02-18 @ 19:15)  BSA (m2): 1.36 ( @ 19:15)    PHYSICAL EXAM:  General: NAD, Lying in bed comfortably  Neuro: A+Ox2, CN2-12 intact, no unilateral deficits  Cardio: Hemodynamically stable  Resp: Good effort  --------------------------------------------------------------------------------------------    LABS  CBC ( @ 11:33)                              12.7                           11.20<H>  )----------------(  370        70.8  % Neutrophils, 19.6  % Lymphocytes, ANC: 7.92<H>                              41.3      BMP ( @ 09:50)             140     |  103     |  26<H> 		Ca++ --      Ca 10.0               ---------------------------------( 162<H>		Mg 2.3                4.9     |  24      |  0.73  			Ph 4.0       LFTs ( @ 09:50)      TPro 6.8 / Alb 4.0 / TBili 0.2 / DBili -- / AST 33 / ALT 41 / AlkPhos 151<H>            --------------------------------------------------------------------------------------------    MICROBIOLOGY  Urinalysis ( @ 10:39):     Color: Light Yellow / Appearance: Clear / S.027<H> / pH: 6.5 / Gluc: Negative / Ketones: Negative / Bili: Negative / Urobili: Negative / Protein :Trace / Nitrites: Positive<!> / Leuk.Est: Large<!> / RBC: 1 / WBC: 99<H> / Sq Epi:  / Non Sq Epi: 1 / Bacteria Many<!>       -> Clean Catch Clean Catch (Midstream) Culture ( @ 12:12)     NG    NG    >100,000 CFU/ml Escherichia coli      --------------------------------------------------------------------------------------------    IMAGING    < from: CT Angio Neck w/ IV Cont (22 @ 10:01) >  FINDINGS:    CT brain:    No hydrocephalus, mass effect, midline shift, acute intracranial   hemorrhage, or brain edema.    Moderate white matter microvascular ischemic disease.    Visualized paranasal sinuses and mastoid air cells are clear.    CTA brain:    Normal flow-related enhancement of the skull base/intracranial internal   carotid arteries.    Normal flow-related enhancement of the bilateral anterior, middle, and   posterior cerebral arteries. Bilateral posterior cerebral arteries   demonstrate fetal origins.    Normal flow-related enhancement of the bilateral intradural vertebral   arteries and the basilar artery.    Laterally projecting right cavernous ICA aneurysm. The dome measures 2 mm   in size. The neck measures 2.5.    No flow-limiting stenosis or AVM.    CTA neck:    Stenoses described in this report are on the basis of NASCET criteria.    75-80% stenosis of the distal right common carotid artery due to heavily   calcified plaque.    Normal flow-related enhancement of the left common carotid artery.    Normal flow-related enhancement of the bilateral internal carotid   arteries.    Calcified plaque at the origins of the bilateral vertebral arteries   results in some levelof stenosis. Normal flow-related enhancement of the   more distal bilateral vertebral arteries.    35% short segment stenosis of the origin of the left subclavian artery   due to heavily calcified plaque.    Approximately 90% short segment stenosis of the mid left subclavian   artery due to heavily calcified plaque. Normal flow-related enhancement   distal to this region of stenosis.    Similar-appearing left upper lobe calcified granulomas.    IMPRESSION:    CT brain:  No hydrocephalus, acute intracranial hemorrhage, mass effect, or brain   edema.  Moderate white matter microvascular ischemic disease.    CTA brain:  Laterally projecting right cavernous ICA aneurysm. The dome measures 2 mm   in size. The neck measures 2.5.    No flow-limitingstenosis.  No AVM.    CTA neck:  75-80% stenosis of the distal right common carotid artery due to heavily   calcified plaque.    35% short segment stenosis of the origin of the left subclavian artery   due to heavily calcified plaque.    Approximately 90% short segment stenosis of the mid left subclavian   artery due to heavily calcified plaque. Normal flow-related enhancement   distal to this region of stenosis.        < end of copied text >

## 2022-02-19 NOTE — BH CONSULTATION LIAISON ASSESSMENT NOTE - CURRENT MEDICATION
MEDICATIONS  (STANDING):  amLODIPine   Tablet 5 milliGRAM(s) Oral daily  lisinopril 20 milliGRAM(s) Oral daily  simvastatin 10 milliGRAM(s) Oral at bedtime    MEDICATIONS  (PRN):

## 2022-02-19 NOTE — BH CONSULTATION LIAISON ASSESSMENT NOTE - CASE SUMMARY
Pt is a 89 yo female, domiciled with sons, retired, no known PPH, with pmhx HTN, CVA, and traumatic SDH who p/w AMS, psych consulted for AMS and repetitive movements. Pt. reports having a fall in early January with subsequent seizure activities and hospitalizations. Says that she has been doing better now. Lives with her two sons and at baseline helps with cleaning, cooking, and feels safe. Is alert and oriented to person, time, and that she is in the hospital (although does not know which hospital she is in). Agree that pt's presentation at this moment likely due to medical condition (recent SDH) and possibly confounded by adjustment disorder d/t a medical condition.

## 2022-02-20 ENCOUNTER — TRANSCRIPTION ENCOUNTER (OUTPATIENT)
Age: 87
End: 2022-02-20

## 2022-02-20 LAB
-  AMIKACIN: SIGNIFICANT CHANGE UP
-  AMOXICILLIN/CLAVULANIC ACID: SIGNIFICANT CHANGE UP
-  AMPICILLIN/SULBACTAM: SIGNIFICANT CHANGE UP
-  AMPICILLIN: SIGNIFICANT CHANGE UP
-  AZTREONAM: SIGNIFICANT CHANGE UP
-  CEFAZOLIN: SIGNIFICANT CHANGE UP
-  CEFEPIME: SIGNIFICANT CHANGE UP
-  CEFOXITIN: SIGNIFICANT CHANGE UP
-  CEFTRIAXONE: SIGNIFICANT CHANGE UP
-  CIPROFLOXACIN: SIGNIFICANT CHANGE UP
-  ERTAPENEM: SIGNIFICANT CHANGE UP
-  GENTAMICIN: SIGNIFICANT CHANGE UP
-  IMIPENEM: SIGNIFICANT CHANGE UP
-  LEVOFLOXACIN: SIGNIFICANT CHANGE UP
-  MEROPENEM: SIGNIFICANT CHANGE UP
-  NITROFURANTOIN: SIGNIFICANT CHANGE UP
-  PIPERACILLIN/TAZOBACTAM: SIGNIFICANT CHANGE UP
-  TIGECYCLINE: SIGNIFICANT CHANGE UP
-  TOBRAMYCIN: SIGNIFICANT CHANGE UP
-  TRIMETHOPRIM/SULFAMETHOXAZOLE: SIGNIFICANT CHANGE UP
ALBUMIN SERPL ELPH-MCNC: 3.8 G/DL — SIGNIFICANT CHANGE UP (ref 3.3–5)
ALP SERPL-CCNC: 135 U/L — HIGH (ref 40–120)
ALT FLD-CCNC: 31 U/L — SIGNIFICANT CHANGE UP (ref 10–45)
ANION GAP SERPL CALC-SCNC: 13 MMOL/L — SIGNIFICANT CHANGE UP (ref 5–17)
AST SERPL-CCNC: 23 U/L — SIGNIFICANT CHANGE UP (ref 10–40)
BILIRUB SERPL-MCNC: 0.3 MG/DL — SIGNIFICANT CHANGE UP (ref 0.2–1.2)
BUN SERPL-MCNC: 26 MG/DL — HIGH (ref 7–23)
CALCIUM SERPL-MCNC: 9.2 MG/DL — SIGNIFICANT CHANGE UP (ref 8.4–10.5)
CHLORIDE SERPL-SCNC: 106 MMOL/L — SIGNIFICANT CHANGE UP (ref 96–108)
CO2 SERPL-SCNC: 23 MMOL/L — SIGNIFICANT CHANGE UP (ref 22–31)
CREAT SERPL-MCNC: 0.82 MG/DL — SIGNIFICANT CHANGE UP (ref 0.5–1.3)
CULTURE RESULTS: SIGNIFICANT CHANGE UP
GLUCOSE SERPL-MCNC: 89 MG/DL — SIGNIFICANT CHANGE UP (ref 70–99)
HCT VFR BLD CALC: 39 % — SIGNIFICANT CHANGE UP (ref 34.5–45)
HGB BLD-MCNC: 12.1 G/DL — SIGNIFICANT CHANGE UP (ref 11.5–15.5)
MCHC RBC-ENTMCNC: 28.7 PG — SIGNIFICANT CHANGE UP (ref 27–34)
MCHC RBC-ENTMCNC: 31 GM/DL — LOW (ref 32–36)
MCV RBC AUTO: 92.4 FL — SIGNIFICANT CHANGE UP (ref 80–100)
METHOD TYPE: SIGNIFICANT CHANGE UP
NRBC # BLD: 0 /100 WBCS — SIGNIFICANT CHANGE UP (ref 0–0)
ORGANISM # SPEC MICROSCOPIC CNT: SIGNIFICANT CHANGE UP
ORGANISM # SPEC MICROSCOPIC CNT: SIGNIFICANT CHANGE UP
PLATELET # BLD AUTO: 328 K/UL — SIGNIFICANT CHANGE UP (ref 150–400)
POTASSIUM SERPL-MCNC: 4.6 MMOL/L — SIGNIFICANT CHANGE UP (ref 3.5–5.3)
POTASSIUM SERPL-SCNC: 4.6 MMOL/L — SIGNIFICANT CHANGE UP (ref 3.5–5.3)
PROT SERPL-MCNC: 6.4 G/DL — SIGNIFICANT CHANGE UP (ref 6–8.3)
RBC # BLD: 4.22 M/UL — SIGNIFICANT CHANGE UP (ref 3.8–5.2)
RBC # FLD: 18.3 % — HIGH (ref 10.3–14.5)
SODIUM SERPL-SCNC: 142 MMOL/L — SIGNIFICANT CHANGE UP (ref 135–145)
SPECIMEN SOURCE: SIGNIFICANT CHANGE UP
WBC # BLD: 9.57 K/UL — SIGNIFICANT CHANGE UP (ref 3.8–10.5)
WBC # FLD AUTO: 9.57 K/UL — SIGNIFICANT CHANGE UP (ref 3.8–10.5)

## 2022-02-20 RX ORDER — CEFTRIAXONE 500 MG/1
1000 INJECTION, POWDER, FOR SOLUTION INTRAMUSCULAR; INTRAVENOUS EVERY 24 HOURS
Refills: 0 | Status: COMPLETED | OUTPATIENT
Start: 2022-02-20 | End: 2022-02-22

## 2022-02-20 RX ORDER — ACETAMINOPHEN 500 MG
650 TABLET ORAL EVERY 6 HOURS
Refills: 0 | Status: DISCONTINUED | OUTPATIENT
Start: 2022-02-20 | End: 2022-02-23

## 2022-02-20 RX ADMIN — SIMVASTATIN 10 MILLIGRAM(S): 20 TABLET, FILM COATED ORAL at 21:25

## 2022-02-20 RX ADMIN — Medication 650 MILLIGRAM(S): at 16:51

## 2022-02-20 RX ADMIN — CEFTRIAXONE 100 MILLIGRAM(S): 500 INJECTION, POWDER, FOR SOLUTION INTRAMUSCULAR; INTRAVENOUS at 12:10

## 2022-02-20 RX ADMIN — Medication 650 MILLIGRAM(S): at 14:17

## 2022-02-20 RX ADMIN — LISINOPRIL 20 MILLIGRAM(S): 2.5 TABLET ORAL at 05:44

## 2022-02-20 RX ADMIN — AMLODIPINE BESYLATE 5 MILLIGRAM(S): 2.5 TABLET ORAL at 05:43

## 2022-02-21 DIAGNOSIS — R29.898 OTHER SYMPTOMS AND SIGNS INVOLVING THE MUSCULOSKELETAL SYSTEM: ICD-10-CM

## 2022-02-21 PROCEDURE — 73620 X-RAY EXAM OF FOOT: CPT | Mod: 26,RT

## 2022-02-21 RX ORDER — IBUPROFEN 200 MG
200 TABLET ORAL EVERY 8 HOURS
Refills: 0 | Status: DISCONTINUED | OUTPATIENT
Start: 2022-02-21 | End: 2022-02-23

## 2022-02-21 RX ADMIN — AMLODIPINE BESYLATE 5 MILLIGRAM(S): 2.5 TABLET ORAL at 06:00

## 2022-02-21 RX ADMIN — LISINOPRIL 20 MILLIGRAM(S): 2.5 TABLET ORAL at 06:00

## 2022-02-21 RX ADMIN — Medication 200 MILLIGRAM(S): at 13:41

## 2022-02-21 RX ADMIN — Medication 650 MILLIGRAM(S): at 03:35

## 2022-02-21 RX ADMIN — CEFTRIAXONE 100 MILLIGRAM(S): 500 INJECTION, POWDER, FOR SOLUTION INTRAMUSCULAR; INTRAVENOUS at 11:11

## 2022-02-21 RX ADMIN — Medication 200 MILLIGRAM(S): at 11:06

## 2022-02-21 NOTE — PHYSICAL THERAPY INITIAL EVALUATION ADULT - PLANNED THERAPY INTERVENTIONS, PT EVAL
GOAL: stair train : pt will negotiate a flight of steps with HR with close supervision in 3 weeks/balance training/bed mobility training/gait training/strengthening/transfer training

## 2022-02-21 NOTE — PHYSICAL THERAPY INITIAL EVALUATION ADULT - DISCHARGE DISPOSITION, PT EVAL
PT recommend Subacute rehab to increase fxl mobility , strength, bal;ance, endurance , fall prevention education continued ; if pt and family decide to take pt home pt need assist all fxl activity and adl's and HPT , pt report son Gordon works from home and ricky home at night as well to assist as need/rehabilitation facility

## 2022-02-21 NOTE — PHYSICAL THERAPY INITIAL EVALUATION ADULT - IMPAIRMENTS CONTRIBUTING IMPAIRED BED MOBILITY, REHAB EVAL
decreased endurance , sat EOB x 10 min work on therex with rest periods x 10 each arom level ii/impaired balance/impaired postural control/impaired sensory feedback/decreased strength

## 2022-02-21 NOTE — PHYSICAL THERAPY INITIAL EVALUATION ADULT - IMPAIRMENTS CONTRIBUTING TO GAIT DEVIATIONS, PT EVAL
see endurance , min unsteady ,limited by pain/impaired balance/pain/impaired postural control/decreased strength

## 2022-02-21 NOTE — PHYSICAL THERAPY INITIAL EVALUATION ADULT - PRECAUTIONS/LIMITATIONS, REHAB EVAL
89 yo female with pmhx  HTN, CVA p/w AMS.  Patient's son reports patient had a fall on Jan 2nd of this year.  Patient was taken to [facility] where she was found to have a traumatic SDH.  Patient was discharged a few days later on a short course of keppra which she finished.  After discharge patient was recuperating at home and slowly getting better.  4-5 days ago patient had an episode of unresponsiveness.  Patient became very lethargic with head bobbing and was unable to speak.  The episode lasted ~1hr then resolved.  Since then she has had several similar episodes ranging from 30min to 5hrs.  Patient followed up with her neurologist and had an at home EEG, but no results available., visual deficits with peripheral vision/fall precautions 89 yo female with pmhx  HTN, CVA p/w AMS.  Patient's son reports patient had a fall on Jan 2nd of this year.  Patient was taken to [facility] where she was found to have a traumatic SDH.  Patient was discharged a few days later on a short course of keppra which she finished.  After discharge patient was recuperating at home and slowly getting better.  4-5 days ago patient had an episode of unresponsiveness.  Patient became very lethargic with head bobbing and was unable to speak.  The episode lasted ~1hr then resolved.  Since then she has had several similar episodes ranging from 30min to 5hrs.  Patient followed up with her neurologist and had an at home EEG, but no results available., visual deficits with peripheral vision;  CT BRAIN No hydrocephalus, acute intracranial hemorrhage, mass effect, or brain edema.CT BRAIN , CTA BRAIN CTA NECK 2/18/22 Moderate white matter microvascular ischemic disease.CTA brain:Laterally projecting R cavernous ICA aneurysm. The dome measures 2 mm in size. The neck measures 2.5.No flow-limiting stenosis.No AVM.CTA neck:75-80% stenosis of the distal right common carotid artery due to heavily calcified plaque.35% short segment stenosis of the origin of the left subclavian artery due to heavily calcified plaque.Approximately 90% short segment stenosis of the mid left subclavian artery due to heavily calcified plaque. Normal flow-related enhancement distal to this region of stenosis.HCT Age-appropriate involutional and ischemic gliotic changes. No hemorrhage or acute extra-axial collections. No significant change since 6/12/2017.pt had fall 4 weeks ago SDH/fall precautions

## 2022-02-21 NOTE — PHYSICAL THERAPY INITIAL EVALUATION ADULT - TRANSFER SAFETY CONCERNS NOTED: SIT/STAND, REHAB EVAL
Subjective:   Bela Gonzalez is a 37 y.o. female who presents today for yearly follow up for inappropriate sinus tachycardia.    She is a patient of Dr. Sullivan.    Hx of palpitations with sinus tachycardia, gestational diabetes, and obesity.    She is 19 weeks pregnant.    She continues with her metoprolol but a reduced dose. She does feel more palpitations and shortness of breath with exertion.    She is overall doing well from an OB standpoint. This was not a planned pregnancy but is being followed closely with her OB now.    Past Medical History:   Diagnosis Date   • Diabetes     gestational   • Elevated WBC count    • Heart burn    • Inappropriate sinus node tachycardia    • Indigestion    • Migraine    • Pain 01/07/15    Back=chronic(scoliosis)>4/10   • Scoliosis      Past Surgical History:   Procedure Laterality Date   • BETH BY LAPAROSCOPY N/A 1/15/2015    Procedure: BETH BY LAPAROSCOPY;  Surgeon: Hima Joseph M.D.;  Location: SURGERY [facility];  Service:    • RECOVERY  5/23/2012    Performed by SURGERY, IR-RECOVERY at SURGERY SAME DAY [facility] ORS   • OTHER      wisdom teeth     Family History   Problem Relation Age of Onset   • Diabetes Mother    • Asthma Sister      Social History     Tobacco Use   Smoking Status Never Smoker   Smokeless Tobacco Never Used     Allergies   Allergen Reactions   • Azithromycin      Z-pack- per pt causes severe diarrhea        Outpatient Encounter Medications as of 11/20/2020   Medication Sig Dispense Refill   • Blood Glucose Monitoring Suppl (ACCU-CHEK GUIDE) w/Device Kit      • ACCU-CHEK GUIDE strip      • Accu-Chek FastClix Lancets Misc      • metoprolol SR (TOPROL XL) 25 MG TABLET SR 24 HR Take 1 Tab by mouth every day. 90 Tab 0   • Omega-3 Fatty Acids (FISH OIL PO) Take  by mouth.     • CALCIUM PO Take  by mouth.     • famotidine (PEPCID) 20 MG Tab Take 20 mg by mouth 2 times a day.     • diphenhydrAMINE HCl (BENADRYL ALLERGY PO) Take  by mouth at  "bedtime as needed.     • clotrimazole (LOTRIMIN) 1 % Cream Apply a small amount to the affected area twice daily x 7 days. 1 Tube 0   • MULTIPLE VITAMIN PO Take 1 Tab by mouth every day. Indications: Prenantal     • alprazolam (XANAX) 0.25 MG Tab Take 0.25 mg by mouth as needed. RARE USE     • citalopram (CELEXA) 20 MG Tab Take 20 mg by mouth every day.       No facility-administered encounter medications on file as of 11/20/2020.      Review of Systems   Constitutional: Negative for fever and malaise/fatigue.   Respiratory: Positive for shortness of breath. Negative for cough.    Cardiovascular: Positive for palpitations. Negative for chest pain, orthopnea, claudication, leg swelling and PND.        With exertion associated with shortness of breath   Gastrointestinal: Negative for abdominal pain.   Musculoskeletal: Negative for myalgias.   Neurological: Weakness:    Psychiatric/Behavioral: The patient is not nervous/anxious.    All other systems reviewed and are negative.       Objective:   /74 (BP Location: Left arm, Patient Position: Sitting, BP Cuff Size: Adult)   Pulse (!) 102   Ht 1.6 m (5' 3\")   Wt 101.2 kg (223 lb)   SpO2 98%   BMI 39.50 kg/m²     Physical Exam   Constitutional: She is oriented to person, place, and time. She appears well-developed and well-nourished. No distress.   obese     HENT:   Head: Normocephalic and atraumatic.   Eyes: EOM are normal.   Neck: Normal range of motion. No JVD present.   Cardiovascular: Normal rate, regular rhythm, normal heart sounds and intact distal pulses.   No murmur heard.  Pulmonary/Chest: Effort normal and breath sounds normal. No respiratory distress.   Abdominal: Soft. Bowel sounds are normal.   19 weeks pregnant   Musculoskeletal: Normal range of motion.         General: No edema.   Neurological: She is alert and oriented to person, place, and time.   Skin: Skin is warm and dry.   Psychiatric: She has a normal mood and affect.   Nursing note and " vitals reviewed.    Reviewed with patient  Lab Results   Component Value Date/Time    CHOLSTRLTOT 200 (H) 12/06/2019 08:05 AM     (H) 12/06/2019 08:05 AM    HDL 47 12/06/2019 08:05 AM    TRIGLYCERIDE 106 12/06/2019 08:05 AM      LABS WITH OB IN 2 WEEKS    2010 ECHO WNL  Assessment:     1. Palpitations     2. Inappropriate sinus tachycardia     3. Overweight     4. Elevated glucose       Medical Decision Making:  Today's Assessment / Status / Plan:     1. Palpitations from inappropriate sinus tachycardia  -cont metoprolol 25 mg XL QPM  -call if palpitations or shortness of breath worsen  -echo in '10 unremarkable  -recommend repeat holter and EP consult post pregnancy for improvement in symptoms     2. Overweight in pregnancy  -lifestyle modifications  -cont with dietary changes and OB visits    FU in clinic in 6 months with SC; call if symptoms worsen for sooner apt    Patient verbalizes understanding and agrees with the plan of care.     Physical Exam   Constitutional: She is oriented to person, place, and time. She appears well-developed and well-nourished. No distress.   obese     HENT:   Head: Normocephalic and atraumatic.   Eyes: EOM are normal.   Neck: Normal range of motion. No JVD present.   Cardiovascular: Normal rate, regular rhythm, normal heart sounds and intact distal pulses.   No murmur heard.  Pulmonary/Chest: Effort normal and breath sounds normal. No respiratory distress.   Abdominal: Soft. Bowel sounds are normal.   19 weeks pregnant   Musculoskeletal: Normal range of motion.         General: No edema.   Neurological: She is alert and oriented to person, place, and time.   Skin: Skin is warm and dry.   Psychiatric: She has a normal mood and affect.   Nursing note and vitals reviewed.       decreased balance during turns/losing balance/decreased step length/decreased weight-shifting ability

## 2022-02-21 NOTE — PHYSICAL THERAPY INITIAL EVALUATION ADULT - BALANCE DISTURBANCE, IDENTIFIED IMPAIRMENT CONTRIBUTE, REHAB EVAL
pt hypersensitive to touch R foot/pain/impaired postural control/impaired sensory feedback/decreased strength

## 2022-02-21 NOTE — PHYSICAL THERAPY INITIAL EVALUATION ADULT - ADDITIONAL COMMENTS
pt prior to fall pt independent (had fall jan 2, 2022 SDH ; pt has 12 steps to enter home with HR lives with 2 sons , pt has a caregiver son Gordon Warner [phone] ; pt owns a rolling walker pt prior to fall pt independent (had fall jan 2, 2022 SDH ; pt has 5-6  steps to enter home with HR per pt and sons live upstairs (lives with 2 sons Gordon and Jermaine )  , pt has a caregiver son Gordon Warner [phone] ; pt owns a rolling walker and recent use of since fall Jan 2 , 2022

## 2022-02-21 NOTE — PHYSICAL THERAPY INITIAL EVALUATION ADULT - GENERAL OBSERVATIONS, REHAB EVAL
pt received in bed nad all siderails up HOB 30 bed alarm active and bed in lowest position , call bell,phone and table in reach ; pt during eval w/ assess pt report in bed R arch pain , hypersensitive to touch even touch of blankets , no visiable issues , tender to palpation mid arch ; per darren Carvalho aware yesterday of same issue ;PT spoke w/ NP Deven at end of session re limitations due to R arch pain

## 2022-02-21 NOTE — PHYSICAL THERAPY INITIAL EVALUATION ADULT - WEIGHT-BEARING RESTRICTIONS: SIT/STAND, REHAB EVAL
pt has no wt bearing restrictions but R foot pt TTWB bc unable to tolerate placing foot flat due to pain R arch

## 2022-02-21 NOTE — PHYSICAL THERAPY INITIAL EVALUATION ADULT - PERTINENT HX OF CURRENT PROBLEM, REHAB EVAL
ADDENDUM 2/22/22 R foot xray 2/21/22 No fractures or dislocations .Tarsometatarsal alignment maintained without evidence for a Lisfranc injury.Hallux valgus deformity w/assoc bunion and mild 1st MTP joint OA & sm amount of surrounding nonspecific amorphous periarticular soft tissue calcific deposition. Preserved remaining visualized joint spaces and no joint margin erosions.Plantar and posterior calcaneal enthesophytes.Generalized osteopenia otherwise no discrete lytic or blastic lesions.

## 2022-02-21 NOTE — PHYSICAL THERAPY INITIAL EVALUATION ADULT - IMPAIRED TRANSFERS: SIT/STAND, REHAB EVAL
decreased endurance , sit-stand at RW min of 1 x 2 pt difficulty placing foot flat due to painful arch R foot (no visible trauma , has full AROM )/impaired balance/pain/impaired postural control/impaired sensory feedback/decreased strength

## 2022-02-21 NOTE — PHYSICAL THERAPY INITIAL EVALUATION ADULT - IMPAIRMENTS FOUND, PT EVAL
painful R arch when touch at rest and with activity/aerobic capacity/endurance/cognitive impairment/gait, locomotion, and balance/muscle strength

## 2022-02-21 NOTE — PHYSICAL THERAPY INITIAL EVALUATION ADULT - MANUAL MUSCLE TESTING RESULTS, REHAB EVAL
ue's 3+/5 , L LE 3+/5 , R LE hip/knee 3/5 , FOOT ankle R 3/5 gross assess as pt hypersensitive to touch R arch and  R arch to palpate

## 2022-02-22 LAB
ALBUMIN SERPL ELPH-MCNC: 3.8 G/DL — SIGNIFICANT CHANGE UP (ref 3.3–5)
ALP SERPL-CCNC: 112 U/L — SIGNIFICANT CHANGE UP (ref 40–120)
ALT FLD-CCNC: 17 U/L — SIGNIFICANT CHANGE UP (ref 10–45)
ANION GAP SERPL CALC-SCNC: 12 MMOL/L — SIGNIFICANT CHANGE UP (ref 5–17)
AST SERPL-CCNC: 17 U/L — SIGNIFICANT CHANGE UP (ref 10–40)
BILIRUB SERPL-MCNC: 0.3 MG/DL — SIGNIFICANT CHANGE UP (ref 0.2–1.2)
BUN SERPL-MCNC: 31 MG/DL — HIGH (ref 7–23)
CALCIUM SERPL-MCNC: 9.4 MG/DL — SIGNIFICANT CHANGE UP (ref 8.4–10.5)
CHLORIDE SERPL-SCNC: 103 MMOL/L — SIGNIFICANT CHANGE UP (ref 96–108)
CO2 SERPL-SCNC: 23 MMOL/L — SIGNIFICANT CHANGE UP (ref 22–31)
CREAT SERPL-MCNC: 0.8 MG/DL — SIGNIFICANT CHANGE UP (ref 0.5–1.3)
GLUCOSE SERPL-MCNC: 172 MG/DL — HIGH (ref 70–99)
HCT VFR BLD CALC: 38.8 % — SIGNIFICANT CHANGE UP (ref 34.5–45)
HGB BLD-MCNC: 12 G/DL — SIGNIFICANT CHANGE UP (ref 11.5–15.5)
MAGNESIUM SERPL-MCNC: 2.2 MG/DL — SIGNIFICANT CHANGE UP (ref 1.6–2.6)
MCHC RBC-ENTMCNC: 28 PG — SIGNIFICANT CHANGE UP (ref 27–34)
MCHC RBC-ENTMCNC: 30.9 GM/DL — LOW (ref 32–36)
MCV RBC AUTO: 90.7 FL — SIGNIFICANT CHANGE UP (ref 80–100)
NRBC # BLD: 0 /100 WBCS — SIGNIFICANT CHANGE UP (ref 0–0)
PHOSPHATE SERPL-MCNC: 2.9 MG/DL — SIGNIFICANT CHANGE UP (ref 2.5–4.5)
PLATELET # BLD AUTO: 314 K/UL — SIGNIFICANT CHANGE UP (ref 150–400)
POTASSIUM SERPL-MCNC: 4.1 MMOL/L — SIGNIFICANT CHANGE UP (ref 3.5–5.3)
POTASSIUM SERPL-SCNC: 4.1 MMOL/L — SIGNIFICANT CHANGE UP (ref 3.5–5.3)
PROT SERPL-MCNC: 6.8 G/DL — SIGNIFICANT CHANGE UP (ref 6–8.3)
RBC # BLD: 4.28 M/UL — SIGNIFICANT CHANGE UP (ref 3.8–5.2)
RBC # FLD: 17.7 % — HIGH (ref 10.3–14.5)
SODIUM SERPL-SCNC: 138 MMOL/L — SIGNIFICANT CHANGE UP (ref 135–145)
WBC # BLD: 11.79 K/UL — HIGH (ref 3.8–10.5)
WBC # FLD AUTO: 11.79 K/UL — HIGH (ref 3.8–10.5)

## 2022-02-22 RX ADMIN — Medication 200 MILLIGRAM(S): at 13:47

## 2022-02-22 RX ADMIN — CEFTRIAXONE 100 MILLIGRAM(S): 500 INJECTION, POWDER, FOR SOLUTION INTRAMUSCULAR; INTRAVENOUS at 13:07

## 2022-02-22 RX ADMIN — AMLODIPINE BESYLATE 5 MILLIGRAM(S): 2.5 TABLET ORAL at 05:23

## 2022-02-22 RX ADMIN — SIMVASTATIN 10 MILLIGRAM(S): 20 TABLET, FILM COATED ORAL at 21:38

## 2022-02-22 RX ADMIN — Medication 200 MILLIGRAM(S): at 14:00

## 2022-02-22 RX ADMIN — LISINOPRIL 20 MILLIGRAM(S): 2.5 TABLET ORAL at 05:23

## 2022-02-23 ENCOUNTER — TRANSCRIPTION ENCOUNTER (OUTPATIENT)
Age: 87
End: 2022-02-23

## 2022-02-23 VITALS
SYSTOLIC BLOOD PRESSURE: 107 MMHG | DIASTOLIC BLOOD PRESSURE: 65 MMHG | OXYGEN SATURATION: 95 % | RESPIRATION RATE: 18 BRPM | HEART RATE: 78 BPM

## 2022-02-23 LAB
ANION GAP SERPL CALC-SCNC: 15 MMOL/L — SIGNIFICANT CHANGE UP (ref 5–17)
BUN SERPL-MCNC: 37 MG/DL — HIGH (ref 7–23)
CALCIUM SERPL-MCNC: 9.5 MG/DL — SIGNIFICANT CHANGE UP (ref 8.4–10.5)
CHLORIDE SERPL-SCNC: 107 MMOL/L — SIGNIFICANT CHANGE UP (ref 96–108)
CO2 SERPL-SCNC: 19 MMOL/L — LOW (ref 22–31)
CREAT SERPL-MCNC: 0.82 MG/DL — SIGNIFICANT CHANGE UP (ref 0.5–1.3)
GLUCOSE SERPL-MCNC: 98 MG/DL — SIGNIFICANT CHANGE UP (ref 70–99)
HCT VFR BLD CALC: 41.6 % — SIGNIFICANT CHANGE UP (ref 34.5–45)
HGB BLD-MCNC: 12.9 G/DL — SIGNIFICANT CHANGE UP (ref 11.5–15.5)
MCHC RBC-ENTMCNC: 28.2 PG — SIGNIFICANT CHANGE UP (ref 27–34)
MCHC RBC-ENTMCNC: 31 GM/DL — LOW (ref 32–36)
MCV RBC AUTO: 90.8 FL — SIGNIFICANT CHANGE UP (ref 80–100)
NRBC # BLD: 0 /100 WBCS — SIGNIFICANT CHANGE UP (ref 0–0)
PLATELET # BLD AUTO: 319 K/UL — SIGNIFICANT CHANGE UP (ref 150–400)
POTASSIUM SERPL-MCNC: 4.3 MMOL/L — SIGNIFICANT CHANGE UP (ref 3.5–5.3)
POTASSIUM SERPL-SCNC: 4.3 MMOL/L — SIGNIFICANT CHANGE UP (ref 3.5–5.3)
RBC # BLD: 4.58 M/UL — SIGNIFICANT CHANGE UP (ref 3.8–5.2)
RBC # FLD: 17.7 % — HIGH (ref 10.3–14.5)
SODIUM SERPL-SCNC: 141 MMOL/L — SIGNIFICANT CHANGE UP (ref 135–145)
WBC # BLD: 9.64 K/UL — SIGNIFICANT CHANGE UP (ref 3.8–10.5)
WBC # FLD AUTO: 9.64 K/UL — SIGNIFICANT CHANGE UP (ref 3.8–10.5)

## 2022-02-23 PROCEDURE — U0005: CPT

## 2022-02-23 PROCEDURE — 84100 ASSAY OF PHOSPHORUS: CPT

## 2022-02-23 PROCEDURE — 87086 URINE CULTURE/COLONY COUNT: CPT

## 2022-02-23 PROCEDURE — 97162 PT EVAL MOD COMPLEX 30 MIN: CPT

## 2022-02-23 PROCEDURE — 81001 URINALYSIS AUTO W/SCOPE: CPT

## 2022-02-23 PROCEDURE — 83735 ASSAY OF MAGNESIUM: CPT

## 2022-02-23 PROCEDURE — G1004: CPT

## 2022-02-23 PROCEDURE — 99285 EMERGENCY DEPT VISIT HI MDM: CPT | Mod: 25

## 2022-02-23 PROCEDURE — 70496 CT ANGIOGRAPHY HEAD: CPT | Mod: MG

## 2022-02-23 PROCEDURE — U0003: CPT

## 2022-02-23 PROCEDURE — 80053 COMPREHEN METABOLIC PANEL: CPT

## 2022-02-23 PROCEDURE — 73620 X-RAY EXAM OF FOOT: CPT

## 2022-02-23 PROCEDURE — 87077 CULTURE AEROBIC IDENTIFY: CPT

## 2022-02-23 PROCEDURE — 71045 X-RAY EXAM CHEST 1 VIEW: CPT

## 2022-02-23 PROCEDURE — 85027 COMPLETE CBC AUTOMATED: CPT

## 2022-02-23 PROCEDURE — 93005 ELECTROCARDIOGRAM TRACING: CPT

## 2022-02-23 PROCEDURE — 36415 COLL VENOUS BLD VENIPUNCTURE: CPT

## 2022-02-23 PROCEDURE — 80048 BASIC METABOLIC PNL TOTAL CA: CPT

## 2022-02-23 PROCEDURE — 97530 THERAPEUTIC ACTIVITIES: CPT

## 2022-02-23 PROCEDURE — 70450 CT HEAD/BRAIN W/O DYE: CPT | Mod: MG

## 2022-02-23 PROCEDURE — 85025 COMPLETE CBC W/AUTO DIFF WBC: CPT

## 2022-02-23 PROCEDURE — 97116 GAIT TRAINING THERAPY: CPT

## 2022-02-23 PROCEDURE — 87186 SC STD MICRODIL/AGAR DIL: CPT

## 2022-02-23 PROCEDURE — 84443 ASSAY THYROID STIM HORMONE: CPT

## 2022-02-23 PROCEDURE — 70498 CT ANGIOGRAPHY NECK: CPT | Mod: MG

## 2022-02-23 PROCEDURE — 96374 THER/PROPH/DIAG INJ IV PUSH: CPT

## 2022-02-23 RX ADMIN — Medication 650 MILLIGRAM(S): at 12:30

## 2022-02-23 RX ADMIN — Medication 650 MILLIGRAM(S): at 11:36

## 2022-02-23 RX ADMIN — LISINOPRIL 20 MILLIGRAM(S): 2.5 TABLET ORAL at 05:34

## 2022-02-23 RX ADMIN — AMLODIPINE BESYLATE 5 MILLIGRAM(S): 2.5 TABLET ORAL at 05:34

## 2022-02-23 NOTE — PROGRESS NOTE ADULT - NUTRITIONAL ASSESSMENT
This patient has been assessed with a concern for Malnutrition and has been determined to have a diagnosis/diagnoses of Severe protein-calorie malnutrition and Underweight (BMI < 19).    This patient is being managed with:   Diet Regular-  Entered: Feb 23 2022 12:00PM

## 2022-02-23 NOTE — PROGRESS NOTE ADULT - PROBLEM SELECTOR PROBLEM 2
ICAO (internal carotid artery occlusion), right
Choreic movements
ICAO (internal carotid artery occlusion), right
Choreic movements
Choreic movements

## 2022-02-23 NOTE — DISCHARGE NOTE PROVIDER - PROVIDER TOKENS
PROVIDER:[TOKEN:[[id number]],FOLLOWUP:[2 weeks]],PROVIDER:[TOKEN:[[id number]],FOLLOWUP:[2 weeks]]

## 2022-02-23 NOTE — DIETITIAN INITIAL EVALUATION ADULT. - PERTINENT LABORATORY DATA
02-23 Na 141 mmol/L Glu 98 mg/dL K+ 4.3 mmol/L Cr  0.82 mg/dL BUN 37 mg/dL<H> Phos n/a   Alb n/a   PAB n/a   Hgb 12.9 g/dL Hct 41.6 %

## 2022-02-23 NOTE — PROGRESS NOTE ADULT - PROBLEM SELECTOR PLAN 4
Continue with Home meds
[facility] PT   Vit B12, RPR, TSH D levels
Continue with Home meds
Continue with Home meds
[facility] PT   Vit B12, RPR, TSH D levels

## 2022-02-23 NOTE — PROGRESS NOTE ADULT - PROBLEM SELECTOR PLAN 1
Neuro eval appreciated, EEG ambulatory no seizure activity   MRI Patient hs a metal in the body as per family  Follow up Vit b12,TSH and RPR   Psych consult appreciated   No psych issues
Ankle pain x rays   Pain meds
Ankle pain x rays   Pain meds
Neuro eval appreciated, EEG ambulatory no seizure activity   MRI Patient hs a metal in the body as per family  Follow up Vit b12,TSH and RPR   Psych consult
Ankle pain x rays   Pain meds

## 2022-02-23 NOTE — DIETITIAN INITIAL EVALUATION ADULT. - PERTINENT MEDS FT
MEDICATIONS  (STANDING):  amLODIPine   Tablet 5 milliGRAM(s) Oral daily  lisinopril 20 milliGRAM(s) Oral daily  simvastatin 10 milliGRAM(s) Oral at bedtime    MEDICATIONS  (PRN):  acetaminophen     Tablet .. 650 milliGRAM(s) Oral every 6 hours PRN Moderate Pain (4 - 6)  ibuprofen  Tablet. 200 milliGRAM(s) Oral every 8 hours PRN Severe Pain (7 - 10)

## 2022-02-23 NOTE — CHART NOTE - NSCHARTNOTEFT_GEN_A_CORE
Due to the patients deconditioning and generalized weakness secondary to Choreic movements and altered mental status, The patient will require a transport chair. Patient is unable to self-propel in a standard wheelchair. This is necessary to achieve daily tasks and therapies which can not be achieved with the use of a cane or rolling walker. Patient and family are in agreement with wheelchair use at home and assistance will be provided if needed.    Alexei Becker PA-C\  [id number]
Vascular Surgery initially called to evaluate R ICA stenosis  - Pt remains asymptomatic  - Pt unlikely to have vertebro-carotid insufficiency  - Please have the pt follow up with Dr. Mckee as outpt after discharge. [facility] surgery will now sign off    Discussed w/ Dr. Rylee Siddiqui PGY-4  [facility]
89F w/ PMH presented w/ AMS and abnormal movements    EEG neg  Cannot get MRI given metal in body     Impression: Abnormal movements possibly psychiatric    Plan:   [] No further neurological workup   [] f/u with General Neurology at [phone] [address] [facility]

## 2022-02-23 NOTE — DISCHARGE NOTE PROVIDER - NSDCFUADDAPPT_GEN_ALL_CORE_FT
** Please call to schedule appointments with both Vascular Surgery and Neurology in the next 2 weeks **

## 2022-02-23 NOTE — PROGRESS NOTE ADULT - PROBLEM SELECTOR PROBLEM 3
HTN (hypertension)
ICAO (internal carotid artery occlusion), right
ICAO (internal carotid artery occlusion), right
HTN (hypertension)
ICAO (internal carotid artery occlusion), right

## 2022-02-23 NOTE — DISCHARGE NOTE PROVIDER - NSDCCPCAREPLAN_GEN_ALL_CORE_FT
PRINCIPAL DISCHARGE DIAGNOSIS  Diagnosis: Acute UTI  Assessment and Plan of Treatment: HOME CARE INSTRUCTIONS  f you were prescribed antibiotics, take them exactly as your caregiver instructs you. Finish the medication even if you feel better after you have only taken some of the medication.  Drink enough water and fluids to keep your urine clear or pale yellow.  Avoid caffeine, tea, and carbonated beverages. They tend to irritate your bladder.  Empty your bladder often. Avoid holding urine for long periods of time.  Empty your bladder before and after sexual intercourse.  After a bowel movement, women should cleanse from front to back. Use each tissue only once.  SEEK MEDICAL CARE IF:  You have back pain.  You develop a fever.  Your symptoms do not begin to resolve within 3 days.  SEEK IMMEDIATE MEDICAL CARE IF:  You have severe back pain or lower abdominal pain.  You develop chills.  You have nausea or vomiting.  You have continued burning or discomfort with urination.        SECONDARY DISCHARGE DIAGNOSES  Diagnosis: ICAO (internal carotid artery occlusion), right  Assessment and Plan of Treatment: Follow up with Dr Mckee Vascular Surgery in the next 2-3 weeks. Tests showed 75-80% blockage of the common carotid artery.    Diagnosis: AMS (altered mental status)  Assessment and Plan of Treatment: Return immediately to the ER if you have a change in behavior or localized weakness, or change in speech or vision.

## 2022-02-23 NOTE — DISCHARGE NOTE NURSING/CASE MANAGEMENT/SOCIAL WORK - NSDCPEFALRISK_GEN_ALL_CORE
For information on Fall & Injury Prevention, visit: [email or web address] OR  [email or web address] OR  https://www.cdc.gov/steadi/patient.html

## 2022-02-23 NOTE — DISCHARGE NOTE NURSING/CASE MANAGEMENT/SOCIAL WORK - PATIENT PORTAL LINK FT
You can access the FollowMyHealth Patient Portal offered by [facility] by registering at the following website: [email or web address]. By joining [facility]’s FollowMyHealth portal, you will also be able to view your health information using other applications (apps) compatible with our system.

## 2022-02-23 NOTE — PROGRESS NOTE ADULT - PROBLEM SELECTOR PROBLEM 1
Choreic movements
Choreic movements
Ankle or foot symptom

## 2022-02-23 NOTE — DIETITIAN INITIAL EVALUATION ADULT. - ADD RECOMMEND
1) Will continue to monitor PO intake, weight, labs, skin, GI status and diet 2) Liberalize diet to regular to optimize calories provided 3) consider addition of Multivitamin if no contraindications 4) Monitor PO intake for need to add oral supplement 1) Will continue to monitor PO intake, weight, labs, skin, GI status and diet 2) Liberalize diet to regular to optimize calories provided 3) consider addition of Multivitamin if no contraindications 4) Monitor PO intake for need to add oral supplement 5) Malnutrition notification placed in chart.

## 2022-02-23 NOTE — PROGRESS NOTE ADULT - PROBLEM SELECTOR PLAN 5
Orthostatics PT   Vit B12, RPR, TSH D levels

## 2022-02-23 NOTE — DISCHARGE NOTE PROVIDER - HOSPITAL COURSE
Assessment and Plan:    Problem/Plan - 1:  ·  Problem: Ankle or foot symptom.   ·  Plan: Ankle pain x rays   Pain meds.     Problem/Plan - 2:  ·  Problem: Choreic movements.   ·  Plan: Neuro eval appreciated, EEG ambulatory no seizure activity   MRI Patient hs a metal in the body as per family  TSH wnl   Psych consult appreciated   No psych issues.     Problem/Plan - 3:  ·  Problem: ICAO (internal carotid artery occlusion), right.   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     Problem/Plan - 4:  ·  Problem: HTN (hypertension).   ·  Plan: Continue with Home meds.     Problem/Plan - 5:  ·  Problem: Frequent falls.   ·  Plan: Orthostatics PT   Vit B12, RPR, TSH D levels.     Problem/Plan - 6:  ·  Problem: HLD (hyperlipidemia).      89 yo female with pmhx  HTN, CVA p/w AMS.  Patient's son reports patient had a fall on Jan 2nd of this year.  Patient was taken to [facility] where she was found to have a traumatic SDH.  Patient was discharged a few days later on a short course of keppra which she finished.  After discharge patient was recuperating at home and slowly getting better.  4-5 days ago patient had an episode of unresponsiveness.  Patient became very lethargic with head bobbing and was unable to speak.  The episode lasted ~1hr then resolved.  Since then she has had several similar episodes ranging from 30min to 5hrs.  Patient followed up with her neurologist and had an at home EEG, but no results available.      Currently patient AAO x3 states that she has problems with side vision cannot see the objects at times when she turns head side wards.   In the ed patient had CTA Brain Neck with ICA stenosis and no other acute findings      Assessment and Plan:    Problem/Plan - 1:  ·  Problem: Ankle or foot symptom.   ·  Plan: Ankle pain x rays - negative  Pain meds.     Problem/Plan - 2:  ·  Problem: Choreic movements.   ·  Plan: Neuro eval appreciated, EEG ambulatory no seizure activity   MRI Patient has  metal in the body as per family  TSH wnl   Psych consult appreciated   No psych issues.     Problem/Plan - 3:  ·  Problem: ICAO (internal carotid artery occlusion), right.   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     Problem/Plan - 4:  ·  Problem: HTN (hypertension).   ·  Plan: Continue with Home meds.     Problem/Plan - 5:  ·  Problem: Frequent falls.   ·  Plan: Orthostatics PT   Vit B12, RPR, TSH D levels.     Problem/Plan - 6:  ·  Problem: HLD (hyperlipidemia).    Medically stable to discharge home on 2/23/22 with Vascular follow up

## 2022-02-23 NOTE — DIETITIAN INITIAL EVALUATION ADULT. - OTHER INFO
Pt states to have poor PO intake since admission, did not elaborate further. Confirmed via flow sheets. no noted nausea/vomiting/constipation/diarrhea. pt with poor po intake and unable to ask pt about chewing/swallowing at this time. Last bowel movement 2/22 per flow sheets.     Dosing weight: 94 pounds  Weight per HIE:  1/25/22 99.8 pounds  This indicates a 5.8 pound / 5.8% weight loss x 1 month

## 2022-02-23 NOTE — PROGRESS NOTE ADULT - PROBLEM SELECTOR PLAN 3
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
Continue with Home meds
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
Continue with Home meds

## 2022-02-23 NOTE — DIETITIAN INITIAL EVALUATION ADULT. - ORAL INTAKE PTA/DIET HISTORY
visited pt at bedside this morning, very tired and unable to obtain full interview. states to not be following a specific diet PTA. no noted vitamins/minerals/oral supplements in outpatient medications. no known allergies per chart.

## 2022-02-23 NOTE — DISCHARGE NOTE PROVIDER - NSDCMRMEDTOKEN_GEN_ALL_CORE_FT
acetaminophen 325 mg oral tablet: 3 tab(s) orally every 6 hours  alendronate 70 mg oral tablet: 1 tab(s) orally once a week  Bactrim  mg-160 mg oral tablet: 1 tab(s) orally 2 times a day. Stop taking on 6/25  lidocaine 5% topical ointment: Apply topically to affected area 2 times a day  Lidoderm 5% topical film: Apply topically to affected area once a day  Norvasc 5 mg oral tablet: 1 tab(s) orally once a day  oxyCODONE 5 mg oral tablet: 1 tab(s) orally every 6 hours MDD:4  ramipril 5 mg oral capsule: 1 cap(s) orally once a day  simvastatin 10 mg oral tablet: 1 tab(s) orally once a day (at bedtime)  Transport Wheelchair:    acetaminophen 325 mg oral tablet: 3 tab(s) orally every 6 hours  alendronate 70 mg oral tablet: 1 tab(s) orally once a week  Bactrim  mg-160 mg oral tablet: 1 tab(s) orally 2 times a day. Stop taking on 6/25  lidocaine 5% topical ointment: Apply topically to affected area 2 times a day  Lidoderm 5% topical film: Apply topically to affected area once a day  Norvasc 5 mg oral tablet: 1 tab(s) orally once a day  oxyCODONE 5 mg oral tablet: 1 tab(s) orally every 6 hours MDD:4  ramipril 5 mg oral capsule: 1 cap(s) orally once a day  simvastatin 10 mg oral tablet: 1 tab(s) orally once a day (at bedtime)  Transport Wheelchair: in the morning and at bedtime

## 2022-02-23 NOTE — DISCHARGE NOTE PROVIDER - CARE PROVIDER_API CALL
Tim Mckee)  Surgery; Vascular Surgery  [address], [address]  [place] [address]  Phone: [phone]  Fax: [phone]  Follow Up Time: 2 weeks    Genie Casey)  Neurology  [address] [address]  Phone: [phone]  Fax: [phone]  Follow Up Time: 2 weeks

## 2022-02-23 NOTE — DISCHARGE NOTE PROVIDER - DETAILS OF MALNUTRITION DIAGNOSIS/DIAGNOSES
This patient has been assessed with a concern for Malnutrition and was treated during this hospitalization for the following Nutrition diagnosis/diagnoses:     -  02/23/2022: Severe protein-calorie malnutrition   -  02/23/2022: Underweight (BMI < 19)

## 2022-02-23 NOTE — DISCHARGE NOTE PROVIDER - CARE PROVIDERS DIRECT ADDRESSES
[email or web address]

## 2022-02-23 NOTE — PROGRESS NOTE ADULT - PROBLEM SELECTOR PLAN 2
Neuro eval appreciated, EEG ambulatory no seizure activity   MRI Patient hs a metal in the body as per family  TSH wnl   Psych consult appreciated   No psych issues
Neuro eval appreciated, EEG ambulatory no seizure activity   MRI Patient hs a metal in the body as per family  TSH wnl   Psych consult appreciated   No psych issues
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
Vaacular consult   Avoid AC dvt ppx   ICA stenosis on CTA neck
Neuro eval appreciated, EEG ambulatory no seizure activity   MRI Patient hs a metal in the body as per family  TSH wnl   Psych consult appreciated   No psych issues

## 2023-01-12 NOTE — PATIENT PROFILE ADULT - HAS THE PATIENT RECEIVED THE INFLUENZA VACCINE THIS SEASON?
Lab Results   Component Value Date    HGBA1C 6.1 (H) 08/25/2022   Continue to monitor.  Discussed possible need for insulin dose reduction while ill.     no...

## 2023-09-06 ENCOUNTER — INPATIENT (INPATIENT)
Facility: HOSPITAL | Age: 88
LOS: 4 days | Discharge: HOME | DRG: 689 | End: 2023-09-11
Attending: INTERNAL MEDICINE | Admitting: INTERNAL MEDICINE
Payer: MEDICARE

## 2023-09-06 VITALS
TEMPERATURE: 100 F | SYSTOLIC BLOOD PRESSURE: 122 MMHG | WEIGHT: 95.02 LBS | DIASTOLIC BLOOD PRESSURE: 73 MMHG | RESPIRATION RATE: 18 BRPM | HEIGHT: 59 IN | HEART RATE: 124 BPM | OXYGEN SATURATION: 94 %

## 2023-09-06 DIAGNOSIS — Z98.51 TUBAL LIGATION STATUS: Chronic | ICD-10-CM

## 2023-09-06 DIAGNOSIS — R50.9 FEVER, UNSPECIFIED: ICD-10-CM

## 2023-09-06 LAB
ALBUMIN SERPL ELPH-MCNC: 3.9 G/DL — SIGNIFICANT CHANGE UP (ref 3.3–5)
ALP SERPL-CCNC: 68 U/L — SIGNIFICANT CHANGE UP (ref 40–120)
ALT FLD-CCNC: 21 U/L — SIGNIFICANT CHANGE UP (ref 10–45)
ANION GAP SERPL CALC-SCNC: 16 MMOL/L — SIGNIFICANT CHANGE UP (ref 5–17)
APPEARANCE UR: ABNORMAL
APTT BLD: 27.8 SEC — SIGNIFICANT CHANGE UP (ref 24.5–35.6)
AST SERPL-CCNC: 16 U/L — SIGNIFICANT CHANGE UP (ref 10–40)
BACTERIA # UR AUTO: ABNORMAL
BASE EXCESS BLDV CALC-SCNC: 2 MMOL/L — SIGNIFICANT CHANGE UP (ref -2–3)
BASOPHILS # BLD AUTO: 0.03 K/UL — SIGNIFICANT CHANGE UP (ref 0–0.2)
BASOPHILS NFR BLD AUTO: 0.2 % — SIGNIFICANT CHANGE UP (ref 0–2)
BILIRUB SERPL-MCNC: 0.4 MG/DL — SIGNIFICANT CHANGE UP (ref 0.2–1.2)
BILIRUB UR-MCNC: NEGATIVE — SIGNIFICANT CHANGE UP
BUN SERPL-MCNC: 27 MG/DL — HIGH (ref 7–23)
CA-I SERPL-SCNC: 1.19 MMOL/L — SIGNIFICANT CHANGE UP (ref 1.15–1.33)
CALCIUM SERPL-MCNC: 9.7 MG/DL — SIGNIFICANT CHANGE UP (ref 8.4–10.5)
CHLORIDE BLDV-SCNC: 104 MMOL/L — SIGNIFICANT CHANGE UP (ref 96–108)
CHLORIDE SERPL-SCNC: 103 MMOL/L — SIGNIFICANT CHANGE UP (ref 96–108)
CO2 BLDV-SCNC: 27 MMOL/L — HIGH (ref 22–26)
CO2 SERPL-SCNC: 22 MMOL/L — SIGNIFICANT CHANGE UP (ref 22–31)
COLOR SPEC: YELLOW — SIGNIFICANT CHANGE UP
CREAT SERPL-MCNC: 0.84 MG/DL — SIGNIFICANT CHANGE UP (ref 0.5–1.3)
DIFF PNL FLD: ABNORMAL
EGFR: 65 ML/MIN/1.73M2 — SIGNIFICANT CHANGE UP
EOSINOPHIL # BLD AUTO: 0 K/UL — SIGNIFICANT CHANGE UP (ref 0–0.5)
EOSINOPHIL NFR BLD AUTO: 0 % — SIGNIFICANT CHANGE UP (ref 0–6)
EPI CELLS # UR: 4 /HPF — SIGNIFICANT CHANGE UP
GAS PNL BLDV: 136 MMOL/L — SIGNIFICANT CHANGE UP (ref 136–145)
GAS PNL BLDV: SIGNIFICANT CHANGE UP
GAS PNL BLDV: SIGNIFICANT CHANGE UP
GLUCOSE BLDV-MCNC: 136 MG/DL — HIGH (ref 70–99)
GLUCOSE SERPL-MCNC: 129 MG/DL — HIGH (ref 70–99)
GLUCOSE UR QL: NEGATIVE — SIGNIFICANT CHANGE UP
HCO3 BLDV-SCNC: 26 MMOL/L — SIGNIFICANT CHANGE UP (ref 22–29)
HCT VFR BLD CALC: 39.4 % — SIGNIFICANT CHANGE UP (ref 34.5–45)
HCT VFR BLDA CALC: 41 % — SIGNIFICANT CHANGE UP (ref 34.5–46.5)
HGB BLD CALC-MCNC: 13.5 G/DL — SIGNIFICANT CHANGE UP (ref 11.7–16.1)
HGB BLD-MCNC: 12.8 G/DL — SIGNIFICANT CHANGE UP (ref 11.5–15.5)
HYALINE CASTS # UR AUTO: 5 /LPF — HIGH (ref 0–2)
IMM GRANULOCYTES NFR BLD AUTO: 0.9 % — SIGNIFICANT CHANGE UP (ref 0–0.9)
INR BLD: 1.16 RATIO — SIGNIFICANT CHANGE UP (ref 0.85–1.18)
KETONES UR-MCNC: ABNORMAL
LACTATE BLDV-MCNC: 1.5 MMOL/L — SIGNIFICANT CHANGE UP (ref 0.5–2)
LEUKOCYTE ESTERASE UR-ACNC: ABNORMAL
LYMPHOCYTES # BLD AUTO: 1.13 K/UL — SIGNIFICANT CHANGE UP (ref 1–3.3)
LYMPHOCYTES # BLD AUTO: 6.5 % — LOW (ref 13–44)
MCHC RBC-ENTMCNC: 29 PG — SIGNIFICANT CHANGE UP (ref 27–34)
MCHC RBC-ENTMCNC: 32.5 GM/DL — SIGNIFICANT CHANGE UP (ref 32–36)
MCV RBC AUTO: 89.3 FL — SIGNIFICANT CHANGE UP (ref 80–100)
MONOCYTES # BLD AUTO: 1.39 K/UL — HIGH (ref 0–0.9)
MONOCYTES NFR BLD AUTO: 8 % — SIGNIFICANT CHANGE UP (ref 2–14)
NEUTROPHILS # BLD AUTO: 14.56 K/UL — HIGH (ref 1.8–7.4)
NEUTROPHILS NFR BLD AUTO: 84.4 % — HIGH (ref 43–77)
NITRITE UR-MCNC: NEGATIVE — SIGNIFICANT CHANGE UP
NRBC # BLD: 0 /100 WBCS — SIGNIFICANT CHANGE UP (ref 0–0)
PCO2 BLDV: 36 MMHG — LOW (ref 39–42)
PH BLDV: 7.46 — HIGH (ref 7.32–7.43)
PH UR: 6 — SIGNIFICANT CHANGE UP (ref 5–8)
PLATELET # BLD AUTO: 328 K/UL — SIGNIFICANT CHANGE UP (ref 150–400)
PO2 BLDV: 48 MMHG — HIGH (ref 25–45)
POTASSIUM BLDV-SCNC: 3.4 MMOL/L — LOW (ref 3.5–5.1)
POTASSIUM SERPL-MCNC: 3.7 MMOL/L — SIGNIFICANT CHANGE UP (ref 3.5–5.3)
POTASSIUM SERPL-SCNC: 3.7 MMOL/L — SIGNIFICANT CHANGE UP (ref 3.5–5.3)
PROT SERPL-MCNC: 7.2 G/DL — SIGNIFICANT CHANGE UP (ref 6–8.3)
PROT UR-MCNC: ABNORMAL
PROTHROM AB SERPL-ACNC: 12.1 SEC — SIGNIFICANT CHANGE UP (ref 9.5–13)
RAPID RVP RESULT: SIGNIFICANT CHANGE UP
RBC # BLD: 4.41 M/UL — SIGNIFICANT CHANGE UP (ref 3.8–5.2)
RBC # FLD: 13.2 % — SIGNIFICANT CHANGE UP (ref 10.3–14.5)
RBC CASTS # UR COMP ASSIST: 4 /HPF — SIGNIFICANT CHANGE UP (ref 0–4)
SAO2 % BLDV: 83.3 % — SIGNIFICANT CHANGE UP (ref 67–88)
SARS-COV-2 RNA SPEC QL NAA+PROBE: SIGNIFICANT CHANGE UP
SODIUM SERPL-SCNC: 141 MMOL/L — SIGNIFICANT CHANGE UP (ref 135–145)
SP GR SPEC: 1.02 — SIGNIFICANT CHANGE UP (ref 1.01–1.02)
UROBILINOGEN FLD QL: NEGATIVE — SIGNIFICANT CHANGE UP
WBC # BLD: 17.27 K/UL — HIGH (ref 3.8–10.5)
WBC # FLD AUTO: 17.27 K/UL — HIGH (ref 3.8–10.5)
WBC UR QL: 712 /HPF — HIGH (ref 0–5)

## 2023-09-06 PROCEDURE — 71045 X-RAY EXAM CHEST 1 VIEW: CPT | Mod: 26

## 2023-09-06 PROCEDURE — 99285 EMERGENCY DEPT VISIT HI MDM: CPT

## 2023-09-06 RX ORDER — LISINOPRIL 2.5 MG/1
10 TABLET ORAL DAILY
Refills: 0 | Status: DISCONTINUED | OUTPATIENT
Start: 2023-09-06 | End: 2023-09-11

## 2023-09-06 RX ORDER — CEFTRIAXONE 500 MG/1
1000 INJECTION, POWDER, FOR SOLUTION INTRAMUSCULAR; INTRAVENOUS ONCE
Refills: 0 | Status: COMPLETED | OUTPATIENT
Start: 2023-09-06 | End: 2023-09-06

## 2023-09-06 RX ORDER — HEPARIN SODIUM 5000 [USP'U]/ML
5000 INJECTION INTRAVENOUS; SUBCUTANEOUS EVERY 12 HOURS
Refills: 0 | Status: DISCONTINUED | OUTPATIENT
Start: 2023-09-06 | End: 2023-09-11

## 2023-09-06 RX ORDER — SODIUM CHLORIDE 9 MG/ML
1000 INJECTION INTRAMUSCULAR; INTRAVENOUS; SUBCUTANEOUS
Refills: 0 | Status: DISCONTINUED | OUTPATIENT
Start: 2023-09-06 | End: 2023-09-07

## 2023-09-06 RX ORDER — ACETAMINOPHEN 500 MG
650 TABLET ORAL EVERY 6 HOURS
Refills: 0 | Status: DISCONTINUED | OUTPATIENT
Start: 2023-09-06 | End: 2023-09-11

## 2023-09-06 RX ORDER — SODIUM CHLORIDE 9 MG/ML
1350 INJECTION, SOLUTION INTRAVENOUS ONCE
Refills: 0 | Status: DISCONTINUED | OUTPATIENT
Start: 2023-09-06 | End: 2023-09-06

## 2023-09-06 RX ORDER — LANOLIN ALCOHOL/MO/W.PET/CERES
1 CREAM (GRAM) TOPICAL
Refills: 0 | DISCHARGE

## 2023-09-06 RX ORDER — CEFTRIAXONE 500 MG/1
1000 INJECTION, POWDER, FOR SOLUTION INTRAMUSCULAR; INTRAVENOUS EVERY 24 HOURS
Refills: 0 | Status: DISCONTINUED | OUTPATIENT
Start: 2023-09-06 | End: 2023-09-10

## 2023-09-06 RX ORDER — ACETAMINOPHEN 500 MG
650 TABLET ORAL ONCE
Refills: 0 | Status: COMPLETED | OUTPATIENT
Start: 2023-09-06 | End: 2023-09-06

## 2023-09-06 RX ORDER — LISINOPRIL 2.5 MG/1
1 TABLET ORAL
Refills: 0 | DISCHARGE

## 2023-09-06 RX ORDER — RAMIPRIL 5 MG
1 CAPSULE ORAL
Qty: 0 | Refills: 0 | DISCHARGE

## 2023-09-06 RX ORDER — SIMVASTATIN 20 MG/1
1 TABLET, FILM COATED ORAL
Qty: 0 | Refills: 0 | DISCHARGE

## 2023-09-06 RX ORDER — AMLODIPINE BESYLATE 2.5 MG/1
1 TABLET ORAL
Qty: 0 | Refills: 0 | DISCHARGE

## 2023-09-06 RX ORDER — SODIUM CHLORIDE 9 MG/ML
1350 INJECTION INTRAMUSCULAR; INTRAVENOUS; SUBCUTANEOUS ONCE
Refills: 0 | Status: COMPLETED | OUTPATIENT
Start: 2023-09-06 | End: 2023-09-06

## 2023-09-06 RX ORDER — ALENDRONATE SODIUM 70 MG/1
1 TABLET ORAL
Qty: 0 | Refills: 0 | DISCHARGE

## 2023-09-06 RX ORDER — PANTOPRAZOLE SODIUM 20 MG/1
40 TABLET, DELAYED RELEASE ORAL
Refills: 0 | Status: DISCONTINUED | OUTPATIENT
Start: 2023-09-06 | End: 2023-09-11

## 2023-09-06 RX ORDER — LANOLIN ALCOHOL/MO/W.PET/CERES
3 CREAM (GRAM) TOPICAL AT BEDTIME
Refills: 0 | Status: DISCONTINUED | OUTPATIENT
Start: 2023-09-06 | End: 2023-09-11

## 2023-09-06 RX ORDER — ATORVASTATIN CALCIUM 80 MG/1
10 TABLET, FILM COATED ORAL AT BEDTIME
Refills: 0 | Status: DISCONTINUED | OUTPATIENT
Start: 2023-09-06 | End: 2023-09-11

## 2023-09-06 RX ADMIN — CEFTRIAXONE 100 MILLIGRAM(S): 500 INJECTION, POWDER, FOR SOLUTION INTRAMUSCULAR; INTRAVENOUS at 18:28

## 2023-09-06 RX ADMIN — SODIUM CHLORIDE 1350 MILLILITER(S): 9 INJECTION INTRAMUSCULAR; INTRAVENOUS; SUBCUTANEOUS at 18:17

## 2023-09-06 RX ADMIN — Medication 260 MILLIGRAM(S): at 18:17

## 2023-09-06 NOTE — ED PROVIDER NOTE - ATTENDING APP SHARED VISIT CONTRIBUTION OF CARE
89-year-old female with past medical history of hypertension, hyperlipidemia, presenting to ED for AMS and R flank pain over past 3 days. Has had cloudy urine for the past week. Associated generalized weakness. Family report hx of similar symptoms previously with infections/UTI. Directed to the emergency department by primary care physician.    Gen: Alert. NAD. Ox3.   HEENT: Atraumatic. Mucous membranes dry.  CV: Tachy. No significant LE edema.   Resp: Unlabored-respirations. CTAB.  GI: Abdomen non tender to palpation, soft. No CVA tenderness to percussion bilat.   Skin/MSK: No open wounds.   Neuro: EOMI. Pupils ERRL. Following commands.  Psych: Normal mood.     VSS, tachy.    ECG rate 98, NSR, Normal intervals. No STEMI. No sig change from Feb 2022.    Consider sepsis 2/2 to UTI, pyelo, bacteremia. Consider encephalopathy in setting of electrolyte abnormality, glucose abnormality, hypovolemia/dehydration, ARSALAN, occult infection, medication side effect, anemia. Will obtain sepsis screening labs, cxr, bcx, provide ivf, abx, tylenol for fever. Pt HDS at time of initial evaluation, although initially tachycardic in triage. Will reassess following initial ED work up. Pt will require admission for further management of AMS in setting of likely infectious process.

## 2023-09-06 NOTE — H&P ADULT - NSHPLABSRESULTS_GEN_ALL_CORE
12.8   17.27 )-----------( 328      ( 06 Sep 2023 18:05 )             39.4   09-06    141  |  103  |  27<H>  ----------------------------<  129<H>  3.7   |  22  |  0.84    Ca    9.7      06 Sep 2023 18:05    TPro  7.2  /  Alb  3.9  /  TBili  0.4  /  DBili  x   /  AST  16  /  ALT  21  /  AlkPhos  68  09-06

## 2023-09-06 NOTE — ED ADULT NURSE NOTE - NSFALLHARMRISKINTERV_ED_ALL_ED

## 2023-09-06 NOTE — ED PROVIDER NOTE - OBJECTIVE STATEMENT
89-year-old female history of hypertension hyperlipidemia brought in by her son for cloudy urine altered mental status and flank pain over the past 3 days.  Cloudy urine started about 7 days ago however the other symptoms did not start until 3 days ago.  Patient has been septic before in the past with a similar presentation.  Patient currently complaining of back pain and feeling weak.  Has not taken anything for these complaints nothing makes it better or worse, spoke with her primary that recommended she come into the emergency department with concern of sepsis.

## 2023-09-06 NOTE — ED ADULT NURSE NOTE - NSSEPSISSUSPECTED_ED_A_ED
Yes [Follow-Up Evaluation] : a follow-up evaluation for [ADHD] : ADHD [Other: ____] : [unfilled] [Mother] : mother [Medical Records] : medical records

## 2023-09-06 NOTE — H&P ADULT - NSHPPHYSICALEXAM_GEN_ALL_CORE
pt. seen and examined     Vital Signs Last 24 Hrs  T(C): 36.5 (06 Sep 2023 22:08), Max: 37.9 (06 Sep 2023 16:20)  T(F): 97.7 (06 Sep 2023 22:08), Max: 100.3 (06 Sep 2023 16:20)  HR: 76 (06 Sep 2023 22:08) (76 - 124)  BP: 159/74 (06 Sep 2023 22:08) (122/73 - 159/74)  BP(mean): --  RR: 18 (06 Sep 2023 22:08) (17 - 18)  SpO2: 97% (06 Sep 2023 22:08) (94% - 97%)    Parameters below as of 06 Sep 2023 22:08  Patient On (Oxygen Delivery Method): room air pt. seen and examined     Vital Signs Last 24 Hrs  T(C): 36.5 (06 Sep 2023 22:08), Max: 37.9 (06 Sep 2023 16:20)  T(F): 97.7 (06 Sep 2023 22:08), Max: 100.3 (06 Sep 2023 16:20)  HR: 76 (06 Sep 2023 22:08) (76 - 124)  BP: 159/74 (06 Sep 2023 22:08) (122/73 - 159/74)  BP(mean): --  RR: 18 (06 Sep 2023 22:08) (17 - 18)  SpO2: 97% (06 Sep 2023 22:08) (94% - 97%)    Parameters below as of 06 Sep 2023 22:08  Patient On (Oxygen Delivery Method): room air    heent : nc/at   neck : supple, no JVD  lungs : B/l fair A/E , no w/r/r  heart: s1s2 nml  abd : soft, NABS, NT/ND  ext : no e/c/c, pulses 1 +  neuro: alert and awake

## 2023-09-06 NOTE — ED ADULT NURSE NOTE - OBJECTIVE STATEMENT
as per pt family, pt "about a week ago started having foul smelling urine and has been starting to act delirious. over the past 3 days, the delirium has gotten worse and she hasn't been acting like herself" pt AOx3 speaking in full complete sentences at present. pt denies any lightheadedness, dizziness, chest pain, SOB, n/v/d, numbness/tingling at present.

## 2023-09-06 NOTE — H&P ADULT - ASSESSMENT
A/P     # AMS / sepsis   # pyelonephritis/ UTI   -US grosly infected   f/u c/s   started on IV rocephine   IV fluids     # adult FTT   -IV fluids   likely 2/2 metabolic encephalopathy 2/2 UTI   supportive care     HTN / HLD  -c/w home meds lisinopril and statin     advance care planning : d/w son regarding advance directive. d/w him regarding intubation / CPR if need arise. at present he is agreeing for everything . remain full code. time spend 15 min

## 2023-09-07 LAB
ALBUMIN SERPL ELPH-MCNC: 3.5 G/DL — SIGNIFICANT CHANGE UP (ref 3.3–5)
ALP SERPL-CCNC: 64 U/L — SIGNIFICANT CHANGE UP (ref 40–120)
ALT FLD-CCNC: 18 U/L — SIGNIFICANT CHANGE UP (ref 10–45)
ANION GAP SERPL CALC-SCNC: 11 MMOL/L — SIGNIFICANT CHANGE UP (ref 5–17)
AST SERPL-CCNC: 16 U/L — SIGNIFICANT CHANGE UP (ref 10–40)
BILIRUB SERPL-MCNC: 0.3 MG/DL — SIGNIFICANT CHANGE UP (ref 0.2–1.2)
BUN SERPL-MCNC: 23 MG/DL — SIGNIFICANT CHANGE UP (ref 7–23)
CALCIUM SERPL-MCNC: 9 MG/DL — SIGNIFICANT CHANGE UP (ref 8.4–10.5)
CHLORIDE SERPL-SCNC: 105 MMOL/L — SIGNIFICANT CHANGE UP (ref 96–108)
CO2 SERPL-SCNC: 25 MMOL/L — SIGNIFICANT CHANGE UP (ref 22–31)
CREAT SERPL-MCNC: 0.72 MG/DL — SIGNIFICANT CHANGE UP (ref 0.5–1.3)
EGFR: 78 ML/MIN/1.73M2 — SIGNIFICANT CHANGE UP
GLUCOSE SERPL-MCNC: 103 MG/DL — HIGH (ref 70–99)
HCT VFR BLD CALC: 40.4 % — SIGNIFICANT CHANGE UP (ref 34.5–45)
HGB BLD-MCNC: 12.7 G/DL — SIGNIFICANT CHANGE UP (ref 11.5–15.5)
MCHC RBC-ENTMCNC: 28.8 PG — SIGNIFICANT CHANGE UP (ref 27–34)
MCHC RBC-ENTMCNC: 31.4 GM/DL — LOW (ref 32–36)
MCV RBC AUTO: 91.6 FL — SIGNIFICANT CHANGE UP (ref 80–100)
NRBC # BLD: 0 /100 WBCS — SIGNIFICANT CHANGE UP (ref 0–0)
PLATELET # BLD AUTO: 288 K/UL — SIGNIFICANT CHANGE UP (ref 150–400)
POTASSIUM SERPL-MCNC: 4 MMOL/L — SIGNIFICANT CHANGE UP (ref 3.5–5.3)
POTASSIUM SERPL-SCNC: 4 MMOL/L — SIGNIFICANT CHANGE UP (ref 3.5–5.3)
PROT SERPL-MCNC: 6.7 G/DL — SIGNIFICANT CHANGE UP (ref 6–8.3)
RBC # BLD: 4.41 M/UL — SIGNIFICANT CHANGE UP (ref 3.8–5.2)
RBC # FLD: 13.2 % — SIGNIFICANT CHANGE UP (ref 10.3–14.5)
SODIUM SERPL-SCNC: 141 MMOL/L — SIGNIFICANT CHANGE UP (ref 135–145)
WBC # BLD: 12.54 K/UL — HIGH (ref 3.8–10.5)
WBC # FLD AUTO: 12.54 K/UL — HIGH (ref 3.8–10.5)

## 2023-09-07 RX ORDER — ACETAMINOPHEN 500 MG
650 TABLET ORAL ONCE
Refills: 0 | Status: COMPLETED | OUTPATIENT
Start: 2023-09-07 | End: 2023-09-07

## 2023-09-07 RX ADMIN — PANTOPRAZOLE SODIUM 40 MILLIGRAM(S): 20 TABLET, DELAYED RELEASE ORAL at 05:42

## 2023-09-07 RX ADMIN — Medication 260 MILLIGRAM(S): at 14:14

## 2023-09-07 RX ADMIN — HEPARIN SODIUM 5000 UNIT(S): 5000 INJECTION INTRAVENOUS; SUBCUTANEOUS at 17:27

## 2023-09-07 RX ADMIN — CEFTRIAXONE 100 MILLIGRAM(S): 500 INJECTION, POWDER, FOR SOLUTION INTRAMUSCULAR; INTRAVENOUS at 17:26

## 2023-09-07 RX ADMIN — ATORVASTATIN CALCIUM 10 MILLIGRAM(S): 80 TABLET, FILM COATED ORAL at 22:24

## 2023-09-07 RX ADMIN — LISINOPRIL 10 MILLIGRAM(S): 2.5 TABLET ORAL at 05:42

## 2023-09-07 RX ADMIN — HEPARIN SODIUM 5000 UNIT(S): 5000 INJECTION INTRAVENOUS; SUBCUTANEOUS at 05:42

## 2023-09-08 LAB
HCT VFR BLD CALC: 45.6 % — HIGH (ref 34.5–45)
HGB BLD-MCNC: 14.5 G/DL — SIGNIFICANT CHANGE UP (ref 11.5–15.5)
MCHC RBC-ENTMCNC: 28.7 PG — SIGNIFICANT CHANGE UP (ref 27–34)
MCHC RBC-ENTMCNC: 31.8 GM/DL — LOW (ref 32–36)
MCV RBC AUTO: 90.3 FL — SIGNIFICANT CHANGE UP (ref 80–100)
NRBC # BLD: 0 /100 WBCS — SIGNIFICANT CHANGE UP (ref 0–0)
PLATELET # BLD AUTO: 359 K/UL — SIGNIFICANT CHANGE UP (ref 150–400)
RBC # BLD: 5.05 M/UL — SIGNIFICANT CHANGE UP (ref 3.8–5.2)
RBC # FLD: 13.2 % — SIGNIFICANT CHANGE UP (ref 10.3–14.5)
WBC # BLD: 15.4 K/UL — HIGH (ref 3.8–10.5)
WBC # FLD AUTO: 15.4 K/UL — HIGH (ref 3.8–10.5)

## 2023-09-08 RX ORDER — INFLUENZA VIRUS VACCINE 15; 15; 15; 15 UG/.5ML; UG/.5ML; UG/.5ML; UG/.5ML
0.7 SUSPENSION INTRAMUSCULAR ONCE
Refills: 0 | Status: DISCONTINUED | OUTPATIENT
Start: 2023-09-08 | End: 2023-09-11

## 2023-09-08 RX ORDER — ALPRAZOLAM 0.25 MG
0.25 TABLET ORAL ONCE
Refills: 0 | Status: DISCONTINUED | OUTPATIENT
Start: 2023-09-08 | End: 2023-09-08

## 2023-09-08 RX ADMIN — Medication 650 MILLIGRAM(S): at 23:00

## 2023-09-08 RX ADMIN — LISINOPRIL 10 MILLIGRAM(S): 2.5 TABLET ORAL at 05:34

## 2023-09-08 RX ADMIN — Medication 0.25 MILLIGRAM(S): at 14:25

## 2023-09-08 RX ADMIN — HEPARIN SODIUM 5000 UNIT(S): 5000 INJECTION INTRAVENOUS; SUBCUTANEOUS at 05:34

## 2023-09-08 RX ADMIN — CEFTRIAXONE 100 MILLIGRAM(S): 500 INJECTION, POWDER, FOR SOLUTION INTRAMUSCULAR; INTRAVENOUS at 17:36

## 2023-09-08 RX ADMIN — HEPARIN SODIUM 5000 UNIT(S): 5000 INJECTION INTRAVENOUS; SUBCUTANEOUS at 17:37

## 2023-09-08 RX ADMIN — Medication 650 MILLIGRAM(S): at 22:08

## 2023-09-08 RX ADMIN — ATORVASTATIN CALCIUM 10 MILLIGRAM(S): 80 TABLET, FILM COATED ORAL at 22:04

## 2023-09-08 RX ADMIN — Medication 3 MILLIGRAM(S): at 22:05

## 2023-09-08 RX ADMIN — PANTOPRAZOLE SODIUM 40 MILLIGRAM(S): 20 TABLET, DELAYED RELEASE ORAL at 05:33

## 2023-09-08 NOTE — PHYSICAL THERAPY INITIAL EVALUATION ADULT - ACTIVE RANGE OF MOTION EXAMINATION, REHAB EVAL
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

## 2023-09-08 NOTE — PHYSICAL THERAPY INITIAL EVALUATION ADULT - NSPTDISCHREC_GEN_A_CORE
Caregiver assistance with all ADLs, mobility, ambulation and stair negotiation to enter home, son agreeable/Home PT

## 2023-09-08 NOTE — PHYSICAL THERAPY INITIAL EVALUATION ADULT - PERTINENT HX OF CURRENT PROBLEM, REHAB EVAL
89-year-old female history of hypertension hyperlipidemia brought in by her son for cloudy urine altered mental status and flank pain over the past 3 days.  Cloudy urine started about 7 days ago however the other symptoms did not start until 3 days ago.  Patient has been septic before in the past with a similar presentation.  Patient currently complaining of back pain and feeling weak.  Has not taken anything for these complaints nothing makes it better or worse, spoke with her primary that recommended she come into the emergency department with concern of sepsis. Adm for mgmt of AMS, sepsis.   XR chest: Hazy bilateral lower lobe opacities suggestive of atelectasis versus trace effusion.

## 2023-09-08 NOTE — PHYSICAL THERAPY INITIAL EVALUATION ADULT - GENERAL OBSERVATIONS, REHAB EVAL
Pt received supine in NAD, with PIV, dressings intact, A&Ox4, VS screened and stable. Son at bedside

## 2023-09-08 NOTE — PATIENT PROFILE ADULT - FALL HARM RISK - HARM RISK INTERVENTIONS

## 2023-09-08 NOTE — PHYSICAL THERAPY INITIAL EVALUATION ADULT - ADDITIONAL COMMENTS
Pt lives in private house with 6 steps to enter +handrail, with son, and her other son lives in upstairs apt. Pt independently performs ADLs, though needs assistance for bathing. Pt amb within home using rolling walker, has one of her sons providing supervision throughout day. Per son, family able to provide supervision at all times following d/c from hospital. owns DME: rolling walker w/c commode.

## 2023-09-08 NOTE — PHYSICAL THERAPY INITIAL EVALUATION ADULT - MANUAL MUSCLE TESTING RESULTS, REHAB EVAL
at least 3+/5 x all 4 extremities grossly assessed through AROM except R shoulder 2/5/grossly assessed due to

## 2023-09-08 NOTE — PHYSICAL THERAPY INITIAL EVALUATION ADULT - GAIT TRAINING, PT EVAL
GOAL: Pt will independently ambulate x150 ft using least restrictive device without loss of balance, within 2 weeks.

## 2023-09-08 NOTE — PROVIDER CONTACT NOTE (CHANGE IN STATUS NOTIFICATION) - SITUATION
Patient is screaming; extremely confused. Son is at bedside. Son states that she has become more confused overnight. Not her usual baseline. Son is requesting something to calm her down.

## 2023-09-09 LAB
BASOPHILS # BLD AUTO: 0.03 K/UL — SIGNIFICANT CHANGE UP (ref 0–0.2)
BASOPHILS NFR BLD AUTO: 0.3 % — SIGNIFICANT CHANGE UP (ref 0–2)
CULTURE RESULTS: SIGNIFICANT CHANGE UP
EOSINOPHIL # BLD AUTO: 0.12 K/UL — SIGNIFICANT CHANGE UP (ref 0–0.5)
EOSINOPHIL NFR BLD AUTO: 1.3 % — SIGNIFICANT CHANGE UP (ref 0–6)
HCT VFR BLD CALC: 35.3 % — SIGNIFICANT CHANGE UP (ref 34.5–45)
HGB BLD-MCNC: 11.7 G/DL — SIGNIFICANT CHANGE UP (ref 11.5–15.5)
IMM GRANULOCYTES NFR BLD AUTO: 1 % — HIGH (ref 0–0.9)
LYMPHOCYTES # BLD AUTO: 1.41 K/UL — SIGNIFICANT CHANGE UP (ref 1–3.3)
LYMPHOCYTES # BLD AUTO: 15.9 % — SIGNIFICANT CHANGE UP (ref 13–44)
MCHC RBC-ENTMCNC: 29.3 PG — SIGNIFICANT CHANGE UP (ref 27–34)
MCHC RBC-ENTMCNC: 33.1 GM/DL — SIGNIFICANT CHANGE UP (ref 32–36)
MCV RBC AUTO: 88.5 FL — SIGNIFICANT CHANGE UP (ref 80–100)
MONOCYTES # BLD AUTO: 0.92 K/UL — HIGH (ref 0–0.9)
MONOCYTES NFR BLD AUTO: 10.3 % — SIGNIFICANT CHANGE UP (ref 2–14)
NEUTROPHILS # BLD AUTO: 6.32 K/UL — SIGNIFICANT CHANGE UP (ref 1.8–7.4)
NEUTROPHILS NFR BLD AUTO: 71.2 % — SIGNIFICANT CHANGE UP (ref 43–77)
NRBC # BLD: 0 /100 WBCS — SIGNIFICANT CHANGE UP (ref 0–0)
PLATELET # BLD AUTO: 283 K/UL — SIGNIFICANT CHANGE UP (ref 150–400)
RBC # BLD: 3.99 M/UL — SIGNIFICANT CHANGE UP (ref 3.8–5.2)
RBC # FLD: 13.2 % — SIGNIFICANT CHANGE UP (ref 10.3–14.5)
SPECIMEN SOURCE: SIGNIFICANT CHANGE UP
WBC # BLD: 8.89 K/UL — SIGNIFICANT CHANGE UP (ref 3.8–10.5)
WBC # FLD AUTO: 8.89 K/UL — SIGNIFICANT CHANGE UP (ref 3.8–10.5)

## 2023-09-09 RX ADMIN — Medication 650 MILLIGRAM(S): at 13:50

## 2023-09-09 RX ADMIN — Medication 650 MILLIGRAM(S): at 21:29

## 2023-09-09 RX ADMIN — HEPARIN SODIUM 5000 UNIT(S): 5000 INJECTION INTRAVENOUS; SUBCUTANEOUS at 18:56

## 2023-09-09 RX ADMIN — Medication 650 MILLIGRAM(S): at 07:45

## 2023-09-09 RX ADMIN — CEFTRIAXONE 100 MILLIGRAM(S): 500 INJECTION, POWDER, FOR SOLUTION INTRAMUSCULAR; INTRAVENOUS at 18:56

## 2023-09-09 RX ADMIN — ATORVASTATIN CALCIUM 10 MILLIGRAM(S): 80 TABLET, FILM COATED ORAL at 21:29

## 2023-09-09 RX ADMIN — Medication 3 MILLIGRAM(S): at 21:29

## 2023-09-09 RX ADMIN — HEPARIN SODIUM 5000 UNIT(S): 5000 INJECTION INTRAVENOUS; SUBCUTANEOUS at 06:46

## 2023-09-09 RX ADMIN — PANTOPRAZOLE SODIUM 40 MILLIGRAM(S): 20 TABLET, DELAYED RELEASE ORAL at 05:10

## 2023-09-09 RX ADMIN — Medication 650 MILLIGRAM(S): at 22:47

## 2023-09-09 RX ADMIN — LISINOPRIL 10 MILLIGRAM(S): 2.5 TABLET ORAL at 05:10

## 2023-09-09 RX ADMIN — Medication 650 MILLIGRAM(S): at 08:45

## 2023-09-09 RX ADMIN — Medication 650 MILLIGRAM(S): at 14:50

## 2023-09-10 ENCOUNTER — TRANSCRIPTION ENCOUNTER (OUTPATIENT)
Age: 88
End: 2023-09-10

## 2023-09-10 RX ORDER — CEFTRIAXONE 500 MG/1
1000 INJECTION, POWDER, FOR SOLUTION INTRAMUSCULAR; INTRAVENOUS EVERY 24 HOURS
Refills: 0 | Status: DISCONTINUED | OUTPATIENT
Start: 2023-09-10 | End: 2023-09-11

## 2023-09-10 RX ADMIN — Medication 3 MILLIGRAM(S): at 21:46

## 2023-09-10 RX ADMIN — Medication 650 MILLIGRAM(S): at 11:09

## 2023-09-10 RX ADMIN — Medication 650 MILLIGRAM(S): at 19:55

## 2023-09-10 RX ADMIN — HEPARIN SODIUM 5000 UNIT(S): 5000 INJECTION INTRAVENOUS; SUBCUTANEOUS at 05:56

## 2023-09-10 RX ADMIN — Medication 650 MILLIGRAM(S): at 10:09

## 2023-09-10 RX ADMIN — ATORVASTATIN CALCIUM 10 MILLIGRAM(S): 80 TABLET, FILM COATED ORAL at 21:46

## 2023-09-10 RX ADMIN — PANTOPRAZOLE SODIUM 40 MILLIGRAM(S): 20 TABLET, DELAYED RELEASE ORAL at 05:56

## 2023-09-10 RX ADMIN — LISINOPRIL 10 MILLIGRAM(S): 2.5 TABLET ORAL at 05:56

## 2023-09-10 RX ADMIN — CEFTRIAXONE 100 MILLIGRAM(S): 500 INJECTION, POWDER, FOR SOLUTION INTRAMUSCULAR; INTRAVENOUS at 18:23

## 2023-09-10 RX ADMIN — HEPARIN SODIUM 5000 UNIT(S): 5000 INJECTION INTRAVENOUS; SUBCUTANEOUS at 18:10

## 2023-09-10 RX ADMIN — Medication 650 MILLIGRAM(S): at 20:55

## 2023-09-10 NOTE — PROGRESS NOTE ADULT - REASON FOR ADMISSION
AMS/ flank pain / pyelonephritis

## 2023-09-10 NOTE — PROGRESS NOTE ADULT - SUBJECTIVE AND OBJECTIVE BOX
Patient is a 89y old  Female who presents with a chief complaint of AMS/ flank pain / pyelonephritis (06 Sep 2023 18:28)      INTERVAL HPI/OVERNIGHT EVENTS: feeling lot better , son bedside   afebrile   T(C): 36.8 (09-07-23 @ 20:13), Max: 36.8 (09-07-23 @ 10:32)  HR: 74 (09-07-23 @ 20:13) (74 - 77)  BP: 114/72 (09-07-23 @ 20:13) (114/72 - 159/74)  RR: 18 (09-07-23 @ 20:13) (17 - 18)  SpO2: 95% (09-07-23 @ 20:13) (95% - 97%)  Wt(kg): --  I&O's Summary    07 Sep 2023 07:01  -  07 Sep 2023 20:57  --------------------------------------------------------  IN: 240 mL / OUT: 400 mL / NET: -160 mL        PAST MEDICAL & SURGICAL HISTORY:  High cholesterol      HTN (hypertension)      Cystocele      Osteoporosis      H/O tubal ligation          SOCIAL HISTORY  Alcohol:  Tobacco:  Illicit substance use:    FAMILY HISTORY:    REVIEW OF SYSTEMS:  CONSTITUTIONAL: No fever, weight loss, or fatigue  EYES: No eye pain, visual disturbances, or discharge  ENMT:  No difficulty hearing, tinnitus, vertigo; No sinus or throat pain  NECK: No pain or stiffness  RESPIRATORY: No cough, wheezing, chills or hemoptysis; No shortness of breath  CARDIOVASCULAR: No chest pain, palpitations, dizziness, or leg swelling  GASTROINTESTINAL: No abdominal or epigastric pain. No nausea, vomiting, or hematemesis; No diarrhea or constipation. No melena or hematochezia.  GENITOURINARY: No dysuria, frequency, hematuria, or incontinence  NEUROLOGICAL: No headaches, memory loss, loss of strength, numbness, or tremors  SKIN: No itching, burning, rashes, or lesions   LYMPH NODES: No enlarged glands  ENDOCRINE: No heat or cold intolerance; No hair loss  MUSCULOSKELETAL: No joint pain or swelling; No muscle, back, or extremity pain  PSYCHIATRIC: No depression, anxiety, mood swings, or difficulty sleeping  HEME/LYMPH: No easy bruising, or bleeding gums  ALLERY AND IMMUNOLOGIC: No hives or eczema    RADIOLOGY & ADDITIONAL TESTS:    Imaging Personally Reviewed:  [ ] YES  [ ] NO    Consultant(s) Notes Reviewed:  [ ] YES  [ ] NO    PHYSICAL EXAM:  GENERAL: NAD, well-groomed, well-developed  HEAD:  Atraumatic, Normocephalic  EYES: EOMI, PERRLA, conjunctiva and sclera clear  ENMT: No tonsillar erythema, exudates, or enlargement; Moist mucous membranes, Good dentition, No lesions  NECK: Supple, No JVD, Normal thyroid  NERVOUS SYSTEM:  Alert & Oriented X3, Good concentration; Motor Strength 5/5 B/L upper and lower extremities; DTRs 2+ intact and symmetric  CHEST/LUNG: Clear to percussion bilaterally; No rales, rhonchi, wheezing, or rubs  HEART: Regular rate and rhythm; No murmurs, rubs, or gallops  ABDOMEN: Soft, Nontender, Nondistended; Bowel sounds present  EXTREMITIES:  2+ Peripheral Pulses, No clubbing, cyanosis, or edema  LYMPH: No lymphadenopathy noted  SKIN: No rashes or lesions    LABS:                        12.7   12.54 )-----------( 288      ( 07 Sep 2023 07:05 )             40.4     09-07    141  |  105  |  23  ----------------------------<  103<H>  4.0   |  25  |  0.72    Ca    9.0      07 Sep 2023 07:05    TPro  6.7  /  Alb  3.5  /  TBili  0.3  /  DBili  x   /  AST  16  /  ALT  18  /  AlkPhos  64  09-07    PT/INR - ( 06 Sep 2023 18:05 )   PT: 12.1 sec;   INR: 1.16 ratio         PTT - ( 06 Sep 2023 18:05 )  PTT:27.8 sec  Urinalysis Basic - ( 07 Sep 2023 07:05 )    Color: x / Appearance: x / SG: x / pH: x  Gluc: 103 mg/dL / Ketone: x  / Bili: x / Urobili: x   Blood: x / Protein: x / Nitrite: x   Leuk Esterase: x / RBC: x / WBC x   Sq Epi: x / Non Sq Epi: x / Bacteria: x      CAPILLARY BLOOD GLUCOSE            Urinalysis Basic - ( 07 Sep 2023 07:05 )    Color: x / Appearance: x / SG: x / pH: x  Gluc: 103 mg/dL / Ketone: x  / Bili: x / Urobili: x   Blood: x / Protein: x / Nitrite: x   Leuk Esterase: x / RBC: x / WBC x   Sq Epi: x / Non Sq Epi: x / Bacteria: x        MEDICATIONS  (STANDING):  atorvastatin 10 milliGRAM(s) Oral at bedtime  cefTRIAXone   IVPB 1000 milliGRAM(s) IV Intermittent every 24 hours  heparin   Injectable 5000 Unit(s) SubCutaneous every 12 hours  lisinopril 10 milliGRAM(s) Oral daily  pantoprazole    Tablet 40 milliGRAM(s) Oral before breakfast    MEDICATIONS  (PRN):  acetaminophen     Tablet .. 650 milliGRAM(s) Oral every 6 hours PRN Temp greater or equal to 38C (100.4F), Moderate Pain (4 - 6)  melatonin 3 milliGRAM(s) Oral at bedtime PRN Insomnia      Care Discussed with Consultants/Other Providers [ ] YES  [ ] NO
Patient is a 89y old  Female who presents with a chief complaint of AMS/ flank pain / pyelonephritis (10 Sep 2023 16:19)      INTERVAL HPI/OVERNIGHT EVENTS: seen and examined, son bedside , denies any c/o  T(C): 37.1 (09-10-23 @ 16:20), Max: 37.1 (09-10-23 @ 16:20)  HR: 82 (09-10-23 @ 16:20) (74 - 82)  BP: 114/67 (09-10-23 @ 16:20) (100/64 - 171/78)  RR: 18 (09-10-23 @ 16:20) (16 - 18)  SpO2: 94% (09-10-23 @ 16:20) (94% - 95%)  Wt(kg): --  I&O's Summary    09 Sep 2023 07:01  -  10 Sep 2023 07:00  --------------------------------------------------------  IN: 1000 mL / OUT: 1051 mL / NET: -51 mL    10 Sep 2023 07:01  -  10 Sep 2023 17:05  --------------------------------------------------------  IN: 0 mL / OUT: 401 mL / NET: -401 mL        PAST MEDICAL & SURGICAL HISTORY:  High cholesterol      HTN (hypertension)      Cystocele      Osteoporosis      H/O tubal ligation          SOCIAL HISTORY  Alcohol:  Tobacco:  Illicit substance use:    FAMILY HISTORY:    REVIEW OF SYSTEMS:  CONSTITUTIONAL: No fever, weight loss, or fatigue  EYES: No eye pain, visual disturbances, or discharge  ENMT:  No difficulty hearing, tinnitus, vertigo; No sinus or throat pain  NECK: No pain or stiffness  RESPIRATORY: No cough, wheezing, chills or hemoptysis; No shortness of breath  CARDIOVASCULAR: No chest pain, palpitations, dizziness, or leg swelling  GASTROINTESTINAL: No abdominal or epigastric pain. No nausea, vomiting, or hematemesis; No diarrhea or constipation. No melena or hematochezia.  GENITOURINARY: No dysuria, frequency, hematuria, or incontinence  NEUROLOGICAL: No headaches, memory loss, loss of strength, numbness, or tremors  SKIN: No itching, burning, rashes, or lesions   LYMPH NODES: No enlarged glands  ENDOCRINE: No heat or cold intolerance; No hair loss  MUSCULOSKELETAL: No joint pain or swelling; No muscle, back, or extremity pain  PSYCHIATRIC: No depression, anxiety, mood swings, or difficulty sleeping  HEME/LYMPH: No easy bruising, or bleeding gums  ALLERY AND IMMUNOLOGIC: No hives or eczema    RADIOLOGY & ADDITIONAL TESTS:    Imaging Personally Reviewed:  [ ] YES  [ ] NO    Consultant(s) Notes Reviewed:  [ ] YES  [ ] NO    PHYSICAL EXAM:  GENERAL: NAD, well-groomed, well-developed  HEAD:  Atraumatic, Normocephalic  EYES: EOMI, PERRLA, conjunctiva and sclera clear  ENMT: No tonsillar erythema, exudates, or enlargement; Moist mucous membranes, Good dentition, No lesions  NECK: Supple, No JVD, Normal thyroid  NERVOUS SYSTEM:  Alert & Oriented X3, Good concentration; Motor Strength 5/5 B/L upper and lower extremities; DTRs 2+ intact and symmetric  CHEST/LUNG: Clear to percussion bilaterally; No rales, rhonchi, wheezing, or rubs  HEART: Regular rate and rhythm; No murmurs, rubs, or gallops  ABDOMEN: Soft, Nontender, Nondistended; Bowel sounds present  EXTREMITIES:  2+ Peripheral Pulses, No clubbing, cyanosis, or edema  LYMPH: No lymphadenopathy noted  SKIN: No rashes or lesions    LABS:                        11.7   8.89  )-----------( 283      ( 09 Sep 2023 07:21 )             35.3               CAPILLARY BLOOD GLUCOSE                MEDICATIONS  (STANDING):  atorvastatin 10 milliGRAM(s) Oral at bedtime  cefTRIAXone   IVPB 1000 milliGRAM(s) IV Intermittent every 24 hours  heparin   Injectable 5000 Unit(s) SubCutaneous every 12 hours  influenza  Vaccine (HIGH DOSE) 0.7 milliLiter(s) IntraMuscular once  lisinopril 10 milliGRAM(s) Oral daily  pantoprazole    Tablet 40 milliGRAM(s) Oral before breakfast    MEDICATIONS  (PRN):  acetaminophen     Tablet .. 650 milliGRAM(s) Oral every 6 hours PRN Temp greater or equal to 38C (100.4F), Moderate Pain (4 - 6)  melatonin 3 milliGRAM(s) Oral at bedtime PRN Insomnia      Care Discussed with Consultants/Other Providers [ ] YES  [ ] NO
Patient is a 89y old  Female who presents with a chief complaint of AMS/ flank pain / pyelonephritis (07 Sep 2023 20:57)      INTERVAL HPI/OVERNIGHT EVENTS: both son bedside , she was confused and agitated last night , ok since morning   T(C): 37.1 (09-08-23 @ 04:07), Max: 37.1 (09-08-23 @ 04:07)  HR: 83 (09-08-23 @ 04:07) (74 - 83)  BP: 155/77 (09-08-23 @ 04:07) (114/72 - 155/77)  RR: 20 (09-08-23 @ 04:07) (18 - 20)  SpO2: 94% (09-08-23 @ 04:07) (94% - 95%)  Wt(kg): --  I&O's Summary    07 Sep 2023 07:01  -  08 Sep 2023 07:00  --------------------------------------------------------  IN: 480 mL / OUT: 900 mL / NET: -420 mL        PAST MEDICAL & SURGICAL HISTORY:  High cholesterol      HTN (hypertension)      Cystocele      Osteoporosis      H/O tubal ligation          SOCIAL HISTORY  Alcohol:  Tobacco:  Illicit substance use:    FAMILY HISTORY:    REVIEW OF SYSTEMS:  CONSTITUTIONAL: No fever, weight loss, or fatigue  EYES: No eye pain, visual disturbances, or discharge  ENMT:  No difficulty hearing, tinnitus, vertigo; No sinus or throat pain  NECK: No pain or stiffness  RESPIRATORY: No cough, wheezing, chills or hemoptysis; No shortness of breath  CARDIOVASCULAR: No chest pain, palpitations, dizziness, or leg swelling  GASTROINTESTINAL: No abdominal or epigastric pain. No nausea, vomiting, or hematemesis; No diarrhea or constipation. No melena or hematochezia.  GENITOURINARY: No dysuria, frequency, hematuria, or incontinence  NEUROLOGICAL: No headaches, memory loss, loss of strength, numbness, or tremors  SKIN: No itching, burning, rashes, or lesions   LYMPH NODES: No enlarged glands  ENDOCRINE: No heat or cold intolerance; No hair loss  MUSCULOSKELETAL: No joint pain or swelling; No muscle, back, or extremity pain  PSYCHIATRIC: No depression, anxiety, mood swings, or difficulty sleeping  HEME/LYMPH: No easy bruising, or bleeding gums  ALLERY AND IMMUNOLOGIC: No hives or eczema    RADIOLOGY & ADDITIONAL TESTS:    Imaging Personally Reviewed:  [ ] YES  [ ] NO    Consultant(s) Notes Reviewed:  [ ] YES  [ ] NO    PHYSICAL EXAM:  GENERAL: NAD, well-groomed, well-developed  HEAD:  Atraumatic, Normocephalic  EYES: EOMI, PERRLA, conjunctiva and sclera clear  ENMT: No tonsillar erythema, exudates, or enlargement; Moist mucous membranes, Good dentition, No lesions  NECK: Supple, No JVD, Normal thyroid  NERVOUS SYSTEM:  Alert & Oriented X3, Good concentration; Motor Strength 5/5 B/L upper and lower extremities; DTRs 2+ intact and symmetric  CHEST/LUNG: Clear to percussion bilaterally; No rales, rhonchi, wheezing, or rubs  HEART: Regular rate and rhythm; No murmurs, rubs, or gallops  ABDOMEN: Soft, Nontender, Nondistended; Bowel sounds present  EXTREMITIES:  2+ Peripheral Pulses, No clubbing, cyanosis, or edema  LYMPH: No lymphadenopathy noted  SKIN: No rashes or lesions    LABS:                        12.7   12.54 )-----------( 288      ( 07 Sep 2023 07:05 )             40.4     09-07    141  |  105  |  23  ----------------------------<  103<H>  4.0   |  25  |  0.72    Ca    9.0      07 Sep 2023 07:05    TPro  6.7  /  Alb  3.5  /  TBili  0.3  /  DBili  x   /  AST  16  /  ALT  18  /  AlkPhos  64  09-07    PT/INR - ( 06 Sep 2023 18:05 )   PT: 12.1 sec;   INR: 1.16 ratio         PTT - ( 06 Sep 2023 18:05 )  PTT:27.8 sec  Urinalysis Basic - ( 07 Sep 2023 07:05 )    Color: x / Appearance: x / SG: x / pH: x  Gluc: 103 mg/dL / Ketone: x  / Bili: x / Urobili: x   Blood: x / Protein: x / Nitrite: x   Leuk Esterase: x / RBC: x / WBC x   Sq Epi: x / Non Sq Epi: x / Bacteria: x      CAPILLARY BLOOD GLUCOSE            Urinalysis Basic - ( 07 Sep 2023 07:05 )    Color: x / Appearance: x / SG: x / pH: x  Gluc: 103 mg/dL / Ketone: x  / Bili: x / Urobili: x   Blood: x / Protein: x / Nitrite: x   Leuk Esterase: x / RBC: x / WBC x   Sq Epi: x / Non Sq Epi: x / Bacteria: x        MEDICATIONS  (STANDING):  atorvastatin 10 milliGRAM(s) Oral at bedtime  cefTRIAXone   IVPB 1000 milliGRAM(s) IV Intermittent every 24 hours  heparin   Injectable 5000 Unit(s) SubCutaneous every 12 hours  influenza  Vaccine (HIGH DOSE) 0.7 milliLiter(s) IntraMuscular once  lisinopril 10 milliGRAM(s) Oral daily  pantoprazole    Tablet 40 milliGRAM(s) Oral before breakfast    MEDICATIONS  (PRN):  acetaminophen     Tablet .. 650 milliGRAM(s) Oral every 6 hours PRN Temp greater or equal to 38C (100.4F), Moderate Pain (4 - 6)  melatonin 3 milliGRAM(s) Oral at bedtime PRN Insomnia      Care Discussed with Consultants/Other Providers [ ] YES  [ ] NO
Patient is a 89y old  Female who presents with a chief complaint of AMS/ flank pain / pyelonephritis (08 Sep 2023 12:21)      INTERVAL HPI/OVERNIGHT EVENTS: doing fair , afebrile , no flank pain   T(C): 36.8 (09-09-23 @ 19:47), Max: 36.8 (09-09-23 @ 19:47)  HR: 78 (09-09-23 @ 19:47) (71 - 78)  BP: 120/77 (09-09-23 @ 19:47) (107/66 - 137/83)  RR: 18 (09-09-23 @ 19:47) (18 - 18)  SpO2: 95% (09-09-23 @ 19:47) (93% - 96%)  Wt(kg): --  I&O's Summary    08 Sep 2023 07:01  -  09 Sep 2023 07:00  --------------------------------------------------------  IN: 460 mL / OUT: 2 mL / NET: 458 mL    09 Sep 2023 07:01  -  09 Sep 2023 21:03  --------------------------------------------------------  IN: 600 mL / OUT: 1 mL / NET: 599 mL        PAST MEDICAL & SURGICAL HISTORY:  High cholesterol      HTN (hypertension)      Cystocele      Osteoporosis      H/O tubal ligation          SOCIAL HISTORY  Alcohol:  Tobacco:  Illicit substance use:    FAMILY HISTORY:    REVIEW OF SYSTEMS:  CONSTITUTIONAL: No fever, weight loss, or fatigue  EYES: No eye pain, visual disturbances, or discharge  ENMT:  No difficulty hearing, tinnitus, vertigo; No sinus or throat pain  NECK: No pain or stiffness  RESPIRATORY: No cough, wheezing, chills or hemoptysis; No shortness of breath  CARDIOVASCULAR: No chest pain, palpitations, dizziness, or leg swelling  GASTROINTESTINAL: No abdominal or epigastric pain. No nausea, vomiting, or hematemesis; No diarrhea or constipation. No melena or hematochezia.  GENITOURINARY: No dysuria, frequency, hematuria, or incontinence  NEUROLOGICAL: No headaches, memory loss, loss of strength, numbness, or tremors  SKIN: No itching, burning, rashes, or lesions   LYMPH NODES: No enlarged glands  ENDOCRINE: No heat or cold intolerance; No hair loss  MUSCULOSKELETAL: No joint pain or swelling; No muscle, back, or extremity pain  PSYCHIATRIC: No depression, anxiety, mood swings, or difficulty sleeping  HEME/LYMPH: No easy bruising, or bleeding gums  ALLERY AND IMMUNOLOGIC: No hives or eczema    RADIOLOGY & ADDITIONAL TESTS:    Imaging Personally Reviewed:  [ ] YES  [ ] NO    Consultant(s) Notes Reviewed:  [ ] YES  [ ] NO    PHYSICAL EXAM:  GENERAL: NAD, well-groomed, well-developed  HEAD:  Atraumatic, Normocephalic  EYES: EOMI, PERRLA, conjunctiva and sclera clear  ENMT: No tonsillar erythema, exudates, or enlargement; Moist mucous membranes, Good dentition, No lesions  NECK: Supple, No JVD, Normal thyroid  NERVOUS SYSTEM:  Alert & Oriented X3, Good concentration; Motor Strength 5/5 B/L upper and lower extremities; DTRs 2+ intact and symmetric  CHEST/LUNG: Clear to percussion bilaterally; No rales, rhonchi, wheezing, or rubs  HEART: Regular rate and rhythm; No murmurs, rubs, or gallops  ABDOMEN: Soft, Nontender, Nondistended; Bowel sounds present  EXTREMITIES:  2+ Peripheral Pulses, No clubbing, cyanosis, or edema  LYMPH: No lymphadenopathy noted  SKIN: No rashes or lesions    LABS:                        11.7   8.89  )-----------( 283      ( 09 Sep 2023 07:21 )             35.3               CAPILLARY BLOOD GLUCOSE                MEDICATIONS  (STANDING):  atorvastatin 10 milliGRAM(s) Oral at bedtime  cefTRIAXone   IVPB 1000 milliGRAM(s) IV Intermittent every 24 hours  heparin   Injectable 5000 Unit(s) SubCutaneous every 12 hours  influenza  Vaccine (HIGH DOSE) 0.7 milliLiter(s) IntraMuscular once  lisinopril 10 milliGRAM(s) Oral daily  pantoprazole    Tablet 40 milliGRAM(s) Oral before breakfast    MEDICATIONS  (PRN):  acetaminophen     Tablet .. 650 milliGRAM(s) Oral every 6 hours PRN Temp greater or equal to 38C (100.4F), Moderate Pain (4 - 6)  melatonin 3 milliGRAM(s) Oral at bedtime PRN Insomnia      Care Discussed with Consultants/Other Providers [ ] YES  [ ] NO

## 2023-09-10 NOTE — DISCHARGE NOTE PROVIDER - NSDCFUADDAPPT_GEN_ALL_CORE_FT
APPTS ARE READY TO BE MADE: [x ] YES    Best Family or Patient Contact (if needed): Patient.     APPTS ARE READY TO BE MADE: [x ] YES    Best Family or Patient Contact (if needed): Patient.      Patient/Caregiver was provided with follow up request details and prefers to call the providers office on their own to schedule.

## 2023-09-10 NOTE — DISCHARGE NOTE PROVIDER - HOSPITAL COURSE
# AMS / sepsis   # pyelonephritis/ UTI   -US grossly infected   f/u c/s : pending   started on IV rocephine   IV fluids   mentation improving     # adult FTT   much better     Sun down / dementia :  on melatonin 3 mg q HS  supportive care     HTN / HLD  -c/w home meds lisinopril and statin     dispo: awaiting urine c/s result , once back , will change to PO abx for total of 7 days and plan for d/c   89-year-old female history of hypertension hyperlipidemia brought in by her son for cloudy urine altered mental status and flank pain over 3 days.  Diagnosed with  AMS/ flank pain / pyelonephritis.    Sepsis secondary to Pyelonephritis/ UTI:  -US grossly infected   f/u c/s.   Started on IV rocephin    Sun down / dementia :  On  melatonin 3 mg q HS  Supportive care     HTN / HLD  Continue with  home meds lisinopril and statin .    Dispo: Based on  urine c/s result, changed IV to PO abx for total of 7 days and plan for d/c.  Pt medically y cleared for discharge home today.

## 2023-09-10 NOTE — DISCHARGE NOTE PROVIDER - NSDCMRMEDTOKEN_GEN_ALL_CORE_FT
Artificial Tears ophthalmic solution: 1 drop(s) in each eye 2 times a day as needed  lisinopril 20 mg oral tablet: 1 tab(s) orally once a day  melatonin 3 mg oral tablet: 1 tab(s) orally once a day (at bedtime)  otc supplements: vitamin b12 / zinc / tylenol as needed  pravastatin 20 mg oral tablet: 1 tab(s) orally once a day (at bedtime)   Artificial Tears ophthalmic solution: 1 drop(s) in each eye 2 times a day as needed  lisinopril 20 mg oral tablet: 1 tab(s) orally once a day  melatonin 3 mg oral tablet: 1 tab(s) orally once a day (at bedtime)  otc supplements: vitamin b12 / zinc / tylenol as needed  Physical Therapy at Home: Evaluate and treat  pravastatin 20 mg oral tablet: 1 tab(s) orally once a day (at bedtime)   Artificial Tears ophthalmic solution: 1 drop(s) in each eye 2 times a day as needed  cefuroxime 250 mg oral tablet: 1 tab(s) orally every 12 hours START ON 9/12 AT 5 PM.  lisinopril 20 mg oral tablet: 1 tab(s) orally once a day  melatonin 3 mg oral tablet: 1 tab(s) orally once a day (at bedtime)  otc supplements: vitamin b12 / zinc / tylenol as needed  Physical Therapy at Home: Evaluate and treat  pravastatin 20 mg oral tablet: 1 tab(s) orally once a day (at bedtime)

## 2023-09-10 NOTE — DISCHARGE NOTE PROVIDER - CARE PROVIDER_API CALL
Pedrito Tsang  Internal Medicine  [address] [place] [address]  Phone: [phone]  Fax: [phone]  Established Patient  Follow Up Time: 1 week

## 2023-09-10 NOTE — PROGRESS NOTE ADULT - ASSESSMENT
A/P     # AMS / sepsis   # pyelonephritis/ UTI   -US grosly infected   f/u c/s : pending   started on IV rocephine   IV fluids   mentation improving     # adult FTT   much better     Sun down / dementia :  on melatonin 3 mg q HS  supportive care     HTN / HLD  -c/w home meds lisinopril and statin     dispo: awaiting urine c/s result , also CBC to trend wbc 
A/P     # AMS / sepsis   # pyelonephritis/ UTI   -US grosly infected   f/u c/s   started on IV rocephine   IV fluids   mentation improving     # adult FTT   much better     HTN / HLD  -c/w home meds lisinopril and statin     dispo: clinically improving , no abd pain , afebrile , wbc improving, awaiting urine c/s 
A/P     # AMS / sepsis   sepsis ruled out by neg blood c/s     # pyelonephritis/ UTI   urine c/s grew >100k coag neg strep  pt doing better on IV rocephien    # adult FTT   much better     Sun down / dementia :  on melatonin 3 mg q HS  supportive care     HTN / HLD  -c/w home meds lisinopril and statin     dispo: d/w son bedside , will plan for d/c home in am on ceftin 250mg bid x 4 more days 
A/P     # AMS / sepsis   # pyelonephritis/ UTI   -US grosly infected   f/u c/s : pending   started on IV rocephine   IV fluids   mentation improving     # adult FTT   much better     Sun down / dementia :  on melatonin 3 mg q HS  supportive care     HTN / HLD  -c/w home meds lisinopril and statin     dispo: awaiting urine c/s result , once back , will change to PO abx for total of 7 days and plan for d/c

## 2023-09-10 NOTE — DISCHARGE NOTE PROVIDER - NSDCCPCAREPLAN_GEN_ALL_CORE_FT
PRINCIPAL DISCHARGE DIAGNOSIS  Diagnosis: Acute UTI  Assessment and Plan of Treatment: Please call your Dr. or report to the ER if you develop fever, worsening symptoms, difficulty urinating, burning with urination, feeling of incomplete emptying. Please follow up with your doctor in one week.      SECONDARY DISCHARGE DIAGNOSES  Diagnosis: Acute UTI  Assessment and Plan of Treatment:      PRINCIPAL DISCHARGE DIAGNOSIS  Diagnosis: Sepsis  Assessment and Plan of Treatment: Take all antibiotics as ordered.  Call you Health care provider upon arrival home to make a one week follow up appointment.  If you develop fever, chills, malaise, or change in mental status call your Health Care Provider or go to the Emergency Department.  Nutrition is important, eat small frequent meals to help ensure you get adequate calories.  Do not stay in bed all day!  Increase your activity daily as tolerated.        SECONDARY DISCHARGE DIAGNOSES  Diagnosis: Acute UTI  Assessment and Plan of Treatment: HOME CARE INSTRUCTIONS  You were prescribed antibiotics, take them exactly as your caregiver instructs you. Finish the medication even if you feel better after you have only taken some of the medication.  Drink enough water and fluids to keep your urine clear or pale yellow.  Avoid caffeine, tea, and carbonated beverages. They tend to irritate your bladder.  Empty your bladder often. Avoid holding urine for long periods of time.  Empty your bladder before and after sexual intercourse.  After a bowel movement, women should cleanse from front to back. Use each tissue only once.  SEEK MEDICAL CARE IF:  You have back pain.  You develop a fever.  Your symptoms do not begin to resolve within 3 days.  SEEK IMMEDIATE MEDICAL CARE IF:  You have severe back pain or lower abdominal pain.  You develop chills.  You have nausea or vomiting.  You have continued burning or discomfort with urination.      Diagnosis: Hypertension  Assessment and Plan of Treatment: Take your medication as prescribed.  Follow up with your medical doctor for routine blood pressure monitoring, and to establish long term blood pressure treatment goals.  Low salt diet  Activity as tolerated.  Notify your doctor if you have any of the following symptoms:   Dizziness, Lightheadedness, Blurry vision, Headache, Chest pain, Shortness of breath      Diagnosis: Dementia  Assessment and Plan of Treatment: On melatonin 3 mg q HS  Supportive care   Safety Precautions.

## 2023-09-11 ENCOUNTER — TRANSCRIPTION ENCOUNTER (OUTPATIENT)
Age: 88
End: 2023-09-11

## 2023-09-11 VITALS
OXYGEN SATURATION: 94 % | TEMPERATURE: 98 F | HEART RATE: 63 BPM | SYSTOLIC BLOOD PRESSURE: 167 MMHG | RESPIRATION RATE: 18 BRPM | DIASTOLIC BLOOD PRESSURE: 77 MMHG

## 2023-09-11 RX ADMIN — PANTOPRAZOLE SODIUM 40 MILLIGRAM(S): 20 TABLET, DELAYED RELEASE ORAL at 06:44

## 2023-09-11 RX ADMIN — Medication 650 MILLIGRAM(S): at 06:45

## 2023-09-11 RX ADMIN — LISINOPRIL 10 MILLIGRAM(S): 2.5 TABLET ORAL at 06:44

## 2023-09-11 RX ADMIN — HEPARIN SODIUM 5000 UNIT(S): 5000 INJECTION INTRAVENOUS; SUBCUTANEOUS at 06:44

## 2023-09-11 NOTE — DISCHARGE NOTE NURSING/CASE MANAGEMENT/SOCIAL WORK - NSDCPNINST_GEN_ALL_CORE
Call for follow up appointments. Any fever, headache, bleeding, pain, notify doctor and if severe present to ER.

## 2023-09-11 NOTE — DISCHARGE NOTE NURSING/CASE MANAGEMENT/SOCIAL WORK - PATIENT PORTAL LINK FT
You can access the FollowMyHealth Patient Portal offered by [facility] by registering at the following website: [email or web address]. By joining [facility]’s FollowMyHealth portal, you will also be able to view your health information using other applications (apps) compatible with our system.

## 2023-09-12 LAB
CULTURE RESULTS: SIGNIFICANT CHANGE UP
CULTURE RESULTS: SIGNIFICANT CHANGE UP
SPECIMEN SOURCE: SIGNIFICANT CHANGE UP
SPECIMEN SOURCE: SIGNIFICANT CHANGE UP

## 2023-09-12 RX ORDER — CEFUROXIME AXETIL 250 MG
1 TABLET ORAL
Qty: 8 | Refills: 0
Start: 2023-09-12 | End: 2023-09-15

## 2023-10-25 ENCOUNTER — NON-APPOINTMENT (OUTPATIENT)
Age: 88
End: 2023-10-25

## 2023-10-25 ENCOUNTER — APPOINTMENT (OUTPATIENT)
Dept: UROLOGY | Facility: CLINIC | Age: 88
End: 2023-10-25
Payer: MEDICARE

## 2023-10-25 VITALS
WEIGHT: 100 LBS | HEART RATE: 90 BPM | DIASTOLIC BLOOD PRESSURE: 79 MMHG | OXYGEN SATURATION: 95 % | TEMPERATURE: 97.2 F | SYSTOLIC BLOOD PRESSURE: 150 MMHG | BODY MASS INDEX: 19.63 KG/M2 | HEIGHT: 60 IN

## 2023-10-25 DIAGNOSIS — C67.9 MALIGNANT NEOPLASM OF BLADDER, UNSPECIFIED: ICD-10-CM

## 2023-10-25 PROCEDURE — 99204 OFFICE O/P NEW MOD 45 MIN: CPT

## 2023-10-30 LAB
APPEARANCE: ABNORMAL
BACTERIA UR CULT: ABNORMAL
BACTERIA: ABNORMAL /HPF
BILIRUBIN URINE: NEGATIVE
BLOOD URINE: ABNORMAL
CAST: 12 /LPF
COLOR: YELLOW
EPITHELIAL CELLS: 2 /HPF
GLUCOSE QUALITATIVE U: NEGATIVE MG/DL
HYALINE CASTS: PRESENT
KETONES URINE: NEGATIVE MG/DL
LEUKOCYTE ESTERASE URINE: ABNORMAL
MICROSCOPIC-UA: NORMAL
NITRITE URINE: POSITIVE
PH URINE: 5
PROTEIN URINE: NORMAL MG/DL
RED BLOOD CELLS URINE: 0 /HPF
REVIEW: NORMAL
SPECIFIC GRAVITY URINE: 1.01
URINE CYTOLOGY: NORMAL
UROBILINOGEN URINE: 0.2 MG/DL
WBC CLUMPS: PRESENT
WHITE BLOOD CELLS URINE: 95 /HPF

## 2023-10-31 NOTE — PATIENT PROFILE ADULT - HEALTH LITERACY
Activities of daily living, including home environment that might     exacerbate pain or reduce effectiveness of the pain management plan of care as well as strategies to address these issues no

## 2023-11-08 ENCOUNTER — APPOINTMENT (OUTPATIENT)
Dept: UROLOGY | Facility: CLINIC | Age: 88
End: 2023-11-08
Payer: MEDICARE

## 2023-11-08 VITALS
DIASTOLIC BLOOD PRESSURE: 72 MMHG | BODY MASS INDEX: 19.24 KG/M2 | WEIGHT: 98 LBS | HEART RATE: 75 BPM | SYSTOLIC BLOOD PRESSURE: 144 MMHG | TEMPERATURE: 97.3 F | HEIGHT: 60 IN | OXYGEN SATURATION: 95 %

## 2023-11-08 PROCEDURE — 76775 US EXAM ABDO BACK WALL LIM: CPT

## 2023-11-08 PROCEDURE — 52000 CYSTOURETHROSCOPY: CPT

## 2023-11-13 PROCEDURE — 85610 PROTHROMBIN TIME: CPT

## 2023-11-13 PROCEDURE — 83605 ASSAY OF LACTIC ACID: CPT

## 2023-11-13 PROCEDURE — 96374 THER/PROPH/DIAG INJ IV PUSH: CPT

## 2023-11-13 PROCEDURE — 99285 EMERGENCY DEPT VISIT HI MDM: CPT | Mod: 25

## 2023-11-13 PROCEDURE — 87040 BLOOD CULTURE FOR BACTERIA: CPT

## 2023-11-13 PROCEDURE — 85018 HEMOGLOBIN: CPT

## 2023-11-13 PROCEDURE — 97161 PT EVAL LOW COMPLEX 20 MIN: CPT

## 2023-11-13 PROCEDURE — 81001 URINALYSIS AUTO W/SCOPE: CPT

## 2023-11-13 PROCEDURE — 87086 URINE CULTURE/COLONY COUNT: CPT

## 2023-11-13 PROCEDURE — 85014 HEMATOCRIT: CPT

## 2023-11-13 PROCEDURE — 85025 COMPLETE CBC W/AUTO DIFF WBC: CPT

## 2023-11-13 PROCEDURE — 84295 ASSAY OF SERUM SODIUM: CPT

## 2023-11-13 PROCEDURE — 71045 X-RAY EXAM CHEST 1 VIEW: CPT

## 2023-11-13 PROCEDURE — 85027 COMPLETE CBC AUTOMATED: CPT

## 2023-11-13 PROCEDURE — 82435 ASSAY OF BLOOD CHLORIDE: CPT

## 2023-11-13 PROCEDURE — 96375 TX/PRO/DX INJ NEW DRUG ADDON: CPT

## 2023-11-13 PROCEDURE — 82330 ASSAY OF CALCIUM: CPT

## 2023-11-13 PROCEDURE — 82947 ASSAY GLUCOSE BLOOD QUANT: CPT

## 2023-11-13 PROCEDURE — 82803 BLOOD GASES ANY COMBINATION: CPT

## 2023-11-13 PROCEDURE — 87077 CULTURE AEROBIC IDENTIFY: CPT

## 2023-11-13 PROCEDURE — 36415 COLL VENOUS BLD VENIPUNCTURE: CPT

## 2023-11-13 PROCEDURE — 0225U NFCT DS DNA&RNA 21 SARSCOV2: CPT

## 2023-11-13 PROCEDURE — 80053 COMPREHEN METABOLIC PANEL: CPT

## 2023-11-13 PROCEDURE — 84132 ASSAY OF SERUM POTASSIUM: CPT

## 2023-11-13 PROCEDURE — 85730 THROMBOPLASTIN TIME PARTIAL: CPT

## 2023-11-21 NOTE — DISCHARGE NOTE PROVIDER - NSDCADMDATE_GEN_ALL_CORE_FT
06-Sep-2023 18:45 Erythromycin Counseling:  I discussed with the patient the risks of erythromycin including but not limited to GI upset, allergic reaction, drug rash, diarrhea, increase in liver enzymes, and yeast infections.

## 2024-01-04 ENCOUNTER — APPOINTMENT (OUTPATIENT)
Dept: UROLOGY | Facility: CLINIC | Age: 89
End: 2024-01-04
Payer: MEDICARE

## 2024-01-04 VITALS
DIASTOLIC BLOOD PRESSURE: 79 MMHG | TEMPERATURE: 97.5 F | WEIGHT: 98 LBS | BODY MASS INDEX: 19.24 KG/M2 | HEIGHT: 60 IN | RESPIRATION RATE: 15 BRPM | HEART RATE: 67 BPM | OXYGEN SATURATION: 97 % | SYSTOLIC BLOOD PRESSURE: 157 MMHG

## 2024-01-04 PROCEDURE — 99214 OFFICE O/P EST MOD 30 MIN: CPT

## 2024-01-04 NOTE — HISTORY OF PRESENT ILLNESS
[FreeTextEntry1] : cc recurrent uti /bladder cancer 89 yo fem h/o bladder ca son reports recent recurrent uti one requiring hospitalization recently  has incontinence  longstanding   h/o bladder ca  last cysto 11/23 son reports foul urine back pain some increased confusion

## 2024-01-08 LAB
APPEARANCE: ABNORMAL
BACTERIA UR CULT: ABNORMAL
BACTERIA: ABNORMAL /HPF
BILIRUBIN URINE: NEGATIVE
BLOOD URINE: ABNORMAL
CAST: 3 /LPF
COLOR: YELLOW
EPITHELIAL CELLS: 2 /HPF
GLUCOSE QUALITATIVE U: NEGATIVE MG/DL
KETONES URINE: NEGATIVE MG/DL
LEUKOCYTE ESTERASE URINE: ABNORMAL
MICROSCOPIC-UA: NORMAL
NITRITE URINE: NEGATIVE
PH URINE: 6
PROTEIN URINE: 30 MG/DL
RED BLOOD CELLS URINE: 3 /HPF
SPECIFIC GRAVITY URINE: 1.02
UROBILINOGEN URINE: 0.2 MG/DL
WHITE BLOOD CELLS URINE: 354 /HPF
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
## 2024-03-29 ENCOUNTER — APPOINTMENT (OUTPATIENT)
Dept: UROLOGY | Facility: CLINIC | Age: 89
End: 2024-03-29
Payer: MEDICARE

## 2024-03-29 VITALS
WEIGHT: 98 LBS | OXYGEN SATURATION: 95 % | SYSTOLIC BLOOD PRESSURE: 168 MMHG | RESPIRATION RATE: 18 BRPM | HEART RATE: 85 BPM | TEMPERATURE: 97.2 F | DIASTOLIC BLOOD PRESSURE: 82 MMHG | HEIGHT: 60 IN | BODY MASS INDEX: 19.24 KG/M2

## 2024-03-29 DIAGNOSIS — N39.0 URINARY TRACT INFECTION, SITE NOT SPECIFIED: ICD-10-CM

## 2024-03-29 PROCEDURE — 99214 OFFICE O/P EST MOD 30 MIN: CPT

## 2024-03-29 RX ORDER — AMOXICILLIN AND CLAVULANATE POTASSIUM 875; 125 MG/1; MG/1
875-125 TABLET, COATED ORAL
Qty: 14 | Refills: 0 | Status: ACTIVE | COMMUNITY
Start: 2023-10-30 | End: 1900-01-01

## 2025-01-22 ENCOUNTER — APPOINTMENT (OUTPATIENT)
Dept: UROLOGY | Facility: CLINIC | Age: 89
End: 2025-01-22
Payer: MEDICARE

## 2025-01-22 VITALS
DIASTOLIC BLOOD PRESSURE: 69 MMHG | HEART RATE: 77 BPM | RESPIRATION RATE: 18 BRPM | HEIGHT: 60 IN | BODY MASS INDEX: 19.63 KG/M2 | SYSTOLIC BLOOD PRESSURE: 166 MMHG | OXYGEN SATURATION: 95 % | WEIGHT: 100 LBS

## 2025-01-22 DIAGNOSIS — N39.0 URINARY TRACT INFECTION, SITE NOT SPECIFIED: ICD-10-CM

## 2025-01-22 PROCEDURE — 99214 OFFICE O/P EST MOD 30 MIN: CPT

## 2025-01-23 LAB
APPEARANCE: ABNORMAL
BACTERIA: ABNORMAL /HPF
BILIRUBIN URINE: NEGATIVE
BLOOD URINE: ABNORMAL
CAST: 2 /LPF
COLOR: YELLOW
EPITHELIAL CELLS: 4 /HPF
GLUCOSE QUALITATIVE U: NEGATIVE MG/DL
KETONES URINE: NEGATIVE MG/DL
LEUKOCYTE ESTERASE URINE: ABNORMAL
MICROSCOPIC-UA: NORMAL
NITRITE URINE: POSITIVE
PH URINE: 6
PROTEIN URINE: NEGATIVE MG/DL
RED BLOOD CELLS URINE: 1 /HPF
SPECIFIC GRAVITY URINE: 1.02
UROBILINOGEN URINE: 0.2 MG/DL
WHITE BLOOD CELLS URINE: 254 /HPF

## 2025-01-27 LAB — BACTERIA UR CULT: ABNORMAL

## 2025-01-27 RX ORDER — CEFUROXIME AXETIL 500 MG/1
500 TABLET, FILM COATED ORAL
Qty: 14 | Refills: 0 | Status: ACTIVE | COMMUNITY
Start: 2025-01-27 | End: 1900-01-01

## 2025-02-26 ENCOUNTER — APPOINTMENT (OUTPATIENT)
Dept: UROLOGY | Facility: CLINIC | Age: 89
End: 2025-02-26
Payer: MEDICARE

## 2025-02-26 VITALS
DIASTOLIC BLOOD PRESSURE: 70 MMHG | HEIGHT: 60 IN | BODY MASS INDEX: 19.63 KG/M2 | OXYGEN SATURATION: 81 % | HEART RATE: 96 BPM | TEMPERATURE: 97.1 F | SYSTOLIC BLOOD PRESSURE: 150 MMHG | WEIGHT: 100 LBS

## 2025-02-26 DIAGNOSIS — N39.0 URINARY TRACT INFECTION, SITE NOT SPECIFIED: ICD-10-CM

## 2025-02-26 PROCEDURE — G2211 COMPLEX E/M VISIT ADD ON: CPT

## 2025-02-26 PROCEDURE — 99214 OFFICE O/P EST MOD 30 MIN: CPT

## 2025-02-26 RX ORDER — SULFAMETHOXAZOLE AND TRIMETHOPRIM 800; 160 MG/1; MG/1
800-160 TABLET ORAL TWICE DAILY
Qty: 10 | Refills: 0 | Status: ACTIVE | COMMUNITY
Start: 2025-02-26 | End: 1900-01-01

## 2025-02-28 LAB
APPEARANCE: CLEAR
BACTERIA: ABNORMAL /HPF
BILIRUBIN URINE: NEGATIVE
BLOOD URINE: ABNORMAL
CAST: 1 /LPF
COLOR: YELLOW
EPITHELIAL CELLS: 3 /HPF
GLUCOSE QUALITATIVE U: NEGATIVE MG/DL
KETONES URINE: NEGATIVE MG/DL
LEUKOCYTE ESTERASE URINE: ABNORMAL
MICROSCOPIC-UA: NORMAL
NITRITE URINE: POSITIVE
PH URINE: 5
PROTEIN URINE: NORMAL MG/DL
RED BLOOD CELLS URINE: 1 /HPF
SPECIFIC GRAVITY URINE: 1.02
UROBILINOGEN URINE: 0.2 MG/DL
WHITE BLOOD CELLS URINE: 211 /HPF

## 2025-03-02 LAB — BACTERIA UR CULT: ABNORMAL

## 2025-07-14 LAB
APPEARANCE: ABNORMAL
BACTERIA UR CULT: ABNORMAL
BACTERIA: ABNORMAL /HPF
BILIRUBIN URINE: NEGATIVE
BLOOD URINE: NEGATIVE
CALCIUM OXALATE CRYSTALS: PRESENT
CAST: 5 /LPF
COLOR: YELLOW
EPITHELIAL CELLS: 4 /HPF
GLUCOSE QUALITATIVE U: NEGATIVE MG/DL
KETONES URINE: NEGATIVE MG/DL
LEUKOCYTE ESTERASE URINE: ABNORMAL
MICROSCOPIC-UA: NORMAL
NITRITE URINE: POSITIVE
PH URINE: 5
PROTEIN URINE: NEGATIVE MG/DL
RED BLOOD CELLS URINE: 5 /HPF
REVIEW: NORMAL
SPECIFIC GRAVITY URINE: 1.02
UROBILINOGEN URINE: 0.2 MG/DL
WHITE BLOOD CELLS URINE: 46 /HPF

## 2025-07-14 RX ORDER — NITROFURANTOIN (MONOHYDRATE/MACROCRYSTALS) 25; 75 MG/1; MG/1
100 CAPSULE ORAL
Qty: 14 | Refills: 0 | Status: ACTIVE | COMMUNITY
Start: 2025-07-14 | End: 1900-01-01

## 2025-07-16 ENCOUNTER — APPOINTMENT (OUTPATIENT)
Dept: UROLOGY | Facility: CLINIC | Age: 89
End: 2025-07-16
Payer: MEDICARE

## 2025-07-16 VITALS
HEART RATE: 84 BPM | SYSTOLIC BLOOD PRESSURE: 120 MMHG | DIASTOLIC BLOOD PRESSURE: 71 MMHG | TEMPERATURE: 97.1 F | OXYGEN SATURATION: 92 %

## 2025-07-16 PROCEDURE — 99213 OFFICE O/P EST LOW 20 MIN: CPT

## 2025-07-16 PROCEDURE — G2211 COMPLEX E/M VISIT ADD ON: CPT

## 2025-07-18 LAB
APPEARANCE: CLEAR
BACTERIA UR CULT: NORMAL
BACTERIA: NEGATIVE /HPF
BILIRUBIN URINE: NEGATIVE
BLOOD URINE: NEGATIVE
CAST: 4 /LPF
COLOR: YELLOW
EPITHELIAL CELLS: 2 /HPF
GLUCOSE QUALITATIVE U: NEGATIVE MG/DL
KETONES URINE: NEGATIVE MG/DL
LEUKOCYTE ESTERASE URINE: ABNORMAL
MICROSCOPIC-UA: NORMAL
NITRITE URINE: NEGATIVE
PH URINE: 5.5
PROTEIN URINE: NEGATIVE MG/DL
RED BLOOD CELLS URINE: 1 /HPF
SPECIFIC GRAVITY URINE: 1.01
UROBILINOGEN URINE: 0.2 MG/DL
WHITE BLOOD CELLS URINE: 4 /HPF

## 2025-07-28 LAB
APPEARANCE: CLEAR
BACTERIA: ABNORMAL /HPF
BILIRUBIN URINE: NEGATIVE
BLOOD URINE: NEGATIVE
CAST: 5 /LPF
COLOR: YELLOW
EPITHELIAL CELLS: 3 /HPF
GLUCOSE QUALITATIVE U: NEGATIVE MG/DL
KETONES URINE: NEGATIVE MG/DL
LEUKOCYTE ESTERASE URINE: ABNORMAL
MICROSCOPIC-UA: NORMAL
NITRITE URINE: NEGATIVE
PH URINE: 5.5
PROTEIN URINE: NEGATIVE MG/DL
RED BLOOD CELLS URINE: 2 /HPF
REVIEW: NORMAL
SPECIFIC GRAVITY URINE: 1.02
UROBILINOGEN URINE: 0.2 MG/DL
WHITE BLOOD CELLS URINE: 10 /HPF

## 2025-07-31 LAB — BACTERIA UR CULT: ABNORMAL
